# Patient Record
Sex: FEMALE | Race: WHITE | NOT HISPANIC OR LATINO | Employment: PART TIME | URBAN - METROPOLITAN AREA
[De-identification: names, ages, dates, MRNs, and addresses within clinical notes are randomized per-mention and may not be internally consistent; named-entity substitution may affect disease eponyms.]

---

## 2017-01-19 ENCOUNTER — ALLSCRIPTS OFFICE VISIT (OUTPATIENT)
Dept: OTHER | Facility: OTHER | Age: 27
End: 2017-01-19

## 2017-02-24 ENCOUNTER — ALLSCRIPTS OFFICE VISIT (OUTPATIENT)
Dept: OTHER | Facility: OTHER | Age: 27
End: 2017-02-24

## 2017-02-24 ENCOUNTER — HOSPITAL ENCOUNTER (OUTPATIENT)
Dept: RADIOLOGY | Facility: HOSPITAL | Age: 27
Discharge: HOME | End: 2017-02-24
Payer: COMMERCIAL

## 2017-02-24 ENCOUNTER — TRANSCRIBE ORDERS (OUTPATIENT)
Dept: ADMINISTRATIVE | Facility: HOSPITAL | Age: 27
End: 2017-02-24

## 2017-02-24 DIAGNOSIS — M53.3 SACROCOCCYGEAL DISORDERS, NOT ELSEWHERE CLASSIFIED: ICD-10-CM

## 2017-02-24 PROCEDURE — 72220 X-RAY EXAM SACRUM TAILBONE: CPT

## 2017-03-01 ENCOUNTER — GENERIC CONVERSION - ENCOUNTER (OUTPATIENT)
Dept: OTHER | Facility: OTHER | Age: 27
End: 2017-03-01

## 2017-07-10 ENCOUNTER — ALLSCRIPTS OFFICE VISIT (OUTPATIENT)
Dept: OTHER | Facility: OTHER | Age: 27
End: 2017-07-10

## 2017-07-10 DIAGNOSIS — R10.32 LEFT LOWER QUADRANT PAIN: ICD-10-CM

## 2017-07-11 ENCOUNTER — GENERIC CONVERSION - ENCOUNTER (OUTPATIENT)
Dept: OTHER | Facility: OTHER | Age: 27
End: 2017-07-11

## 2017-07-14 ENCOUNTER — GENERIC CONVERSION - ENCOUNTER (OUTPATIENT)
Dept: OTHER | Facility: OTHER | Age: 27
End: 2017-07-14

## 2017-07-14 LAB
ADEQUACY: (HISTORICAL): NORMAL
CHLAMYDIA DFA, NAA OR PCR (HISTORICAL): NEGATIVE
CLINICIAN PROVIDIED ICD 9 OR 10 (HISTORICAL): NORMAL
COMMENT (HISTORICAL): NORMAL
DIAGNOSIS (HISTORICAL): NORMAL
GC, DNA PROB (HISTORICAL): NEGATIVE
NOTE: (HISTORICAL): NORMAL
PERFORMED BY (HISTORICAL): NORMAL
REFLEX (HISTORICAL): NORMAL

## 2017-08-10 ENCOUNTER — ALLSCRIPTS OFFICE VISIT (OUTPATIENT)
Dept: OTHER | Facility: OTHER | Age: 27
End: 2017-08-10

## 2017-08-10 DIAGNOSIS — M41.20 OTHER IDIOPATHIC SCOLIOSIS, SITE UNSPECIFIED: ICD-10-CM

## 2017-08-15 ENCOUNTER — GENERIC CONVERSION - ENCOUNTER (OUTPATIENT)
Dept: OTHER | Facility: OTHER | Age: 27
End: 2017-08-15

## 2017-08-31 ENCOUNTER — ALLSCRIPTS OFFICE VISIT (OUTPATIENT)
Dept: OTHER | Facility: OTHER | Age: 27
End: 2017-08-31

## 2017-09-19 ENCOUNTER — GENERIC CONVERSION - ENCOUNTER (OUTPATIENT)
Dept: OTHER | Facility: OTHER | Age: 27
End: 2017-09-19

## 2017-09-28 ENCOUNTER — GENERIC CONVERSION - ENCOUNTER (OUTPATIENT)
Dept: OTHER | Facility: OTHER | Age: 27
End: 2017-09-28

## 2017-11-30 ENCOUNTER — TRANSCRIBE ORDERS (OUTPATIENT)
Dept: ADMINISTRATIVE | Facility: HOSPITAL | Age: 27
End: 2017-11-30

## 2017-11-30 DIAGNOSIS — Z36.89 SCREENING FOR PREGNANCY-ASSOCIATED PLASMA PROTEIN A: Primary | ICD-10-CM

## 2017-12-07 ENCOUNTER — GENERIC CONVERSION - ENCOUNTER (OUTPATIENT)
Dept: OTHER | Facility: OTHER | Age: 27
End: 2017-12-07

## 2017-12-07 ENCOUNTER — HOSPITAL ENCOUNTER (OUTPATIENT)
Dept: ULTRASOUND IMAGING | Facility: HOSPITAL | Age: 27
Discharge: HOME | End: 2017-12-07
Payer: COMMERCIAL

## 2017-12-07 DIAGNOSIS — Z36.89 SCREENING FOR PREGNANCY-ASSOCIATED PLASMA PROTEIN A: ICD-10-CM

## 2017-12-07 PROCEDURE — 76801 OB US < 14 WKS SINGLE FETUS: CPT

## 2017-12-13 ENCOUNTER — GENERIC CONVERSION - ENCOUNTER (OUTPATIENT)
Dept: FAMILY MEDICINE CLINIC | Facility: CLINIC | Age: 27
End: 2017-12-13

## 2018-01-11 NOTE — RESULT NOTES
Verified Results  * XR SACRUM AND COCCYX [id number] 09:53AM [facility] Number: [id number]     Test Name Result Flag Reference   XR SACRUM AND COCCYX (Report)     SACRUM AND COCCYX     INDICATION: Sacral/coccygeal pain  COMPARISON: None     VIEWS: 3     IMAGES: 3      FINDINGS:     There is no acute evidence of fracture  Lucency through the left hemisacrum appears to extend beyond the osseous margins suggesting its overlying bowel gas  Sacral arcuate lines are maintained  The SI joints appear symmetric  Pubic symphysis maintained  IMPRESSION:     No displaced fracture  Workstation performed: [id number]     Signed by:   Sajan Patino MD   2/27/17       Discussion/Summary   please call      there is no evidence of fracture of the sacrum/coccyx

## 2018-01-11 NOTE — MISCELLANEOUS
Message   Recorded as Task   Date: 10/19/2016 10:23 AM, Created By: Roselyn Orozco   Task Name: Medical Complaint Callback   Assigned To: [address] [place] [address]   Regarding Patient: Justice Fernández, Status: Active   Comment:    Danielle Xiao - 19 Oct 2016 10:23 AM     TASK CREATED  Caller: Self; Medical Complaint; [phone] (Home); [phone] (Work)  was in [address] [facility] er for chest pain - was advised to cardio and also to have an echo done -     does see a [facility], who is away, office staff advised to request echo order from pcp    (did see dr Solomon Washington 9/20 when dr Mata Malik was out of office, but not xferring to that office)   Galileo Rudolph - 19 Oct 2016 12:03 PM     TASK REASSIGNED: Previously Assigned To Galileo Rudolph Mary - 19 Oct 2016 4:27 PM     TASK REPLIED TO: Previously Assigned To [address] [place] [address]  Did you want to order the echo or sched w/her Cardiologist?   Galileo Rudolph - 19 Oct 2016 5:08 PM     TASK REASSIGNED: Previously Assigned To Galileo Rudolph      echo ordered and should get this  she should also schedule a visit with her cardio once he is available  Flakita Galvan - 19 Oct 2016 6:23 PM     TASK EDITED                 pt aware and has appt with card  11/4        Active Problems    1  Abrasion of nose (910 0) (S00 31XA)   2  Acute upper respiratory infection (465 9) (J06 9)   3  Adult congenital heart disease (746 9) (Q24 9)   4  Cellulitis of thigh (682 6) (L03 119)   5  Chest pain (786 50) (R07 9)   6  Chronic headache (784 0) (R51)   7  Exudative tonsillitis (463) (J03 90)   8  Flu vaccine need (V04 81) (Z23)   9  Mitral valve prolapse (424 0) (I34 1)   10  Oral contraceptive prescribed (V25 01) (Z30 011)   11  Pap smear for cervical cancer screening (V76 2) (Z12 4)   12  Pregnancy (V22 2) (Z33 1)   13  Screening for diabetes mellitus (V77 1) (Z13 1)   14   Screening for lipid disorders (V77 91) (Z13 220)   15  Upper respiratory infection (465 9) (J06 9)    Current Meds   1  Amoxicillin 500 MG Oral Capsule; TAKE 1 CAPSULE 3 TIMES DAILY UNTIL GONE;   Therapy: 57WHO6077 to (Evaluate:32Ghw9836)  Requested for: 38INW8500; Last   Rx:74Xjr4375 Ordered   2  Cephalexin 500 MG Oral Tablet; take 1 tab by mouh 4 times a day; Therapy: 20Jun2016 to (Last Rx:20Jun2016)  Requested for: 20Jun2016 Ordered   3  Prenatal 28-0 8 MG Oral Tablet; Therapy: 83LRH0182 to Recorded    Allergies    1   Benadryl TABS    Signatures   Electronically signed by : Shreyas Toussaint MD; Oct 20 2016 11:10AM EST                       (Author)

## 2018-01-11 NOTE — MISCELLANEOUS
Message   Recorded as Task   Date: 12/06/2016 10:44 AM, Created By: Beatriz Jackson   Task Name: Medical Complaint Callback   Assigned To: [address] [facility]   Regarding Patient: Carlee Perry, Status: Active   Comment:    Beatriz Jackson - 06 Dec 2016 10:44 AM     TASK CREATED  Caller: Self; Medical Complaint; [phone] (Home); [phone] (Work)  was in yesterday to have b ears flushed - pt stating that clogged feeling is worse and she can barely hear - no drainage - no pain - no fever - is this normal?  should she be seen again? please advise   William Salazarman - 06 Dec 2016 10:54 AM     TASK REPLIED TO: Previously Assigned To Hyun Lynn                      she needs to see ENT   Beatriz Jackson - 06 Dec 2016 10:58 AM     TASK REASSIGNED: Previously Assigned To Beatriz Jackson Pamela - 06 Dec 2016 11:51 AM     TASK REPLIED TO: Previously Assigned To [address] [facility]  Pt aware        Active Problems    1  History of Abrasion of nose (910 0) (S00 31XA)   2  Adult congenital heart disease (746 9) (Q24 9)   3  Chronic headache (784 0) (R51)   4  Flu vaccine need (V04 81) (Z23)   5  History of chest pain (V13 89) (Z87 898)   6  History of pregnancy (V13 29)   7  History of upper respiratory infection (V12 09) (Z87 09)   8  Impacted cerumen of both ears (380 4) (H61 23)   9  Mitral valve prolapse (424 0) (I34 1)   10  Pap smear for cervical cancer screening (V76 2) (Z12 4)   11  Screening for diabetes mellitus (V77 1) (Z13 1)   12  Screening for lipid disorders (V77 91) (Z13 220)    Current Meds   1  No Reported Medications Recorded    Allergies    1   Benadryl TABS    Signatures   Electronically signed by : Barbara Campa DO; Dec  6 2016 11:55AM EST                       (Author)

## 2018-01-12 VITALS
SYSTOLIC BLOOD PRESSURE: 110 MMHG | HEIGHT: 62 IN | BODY MASS INDEX: 23.97 KG/M2 | DIASTOLIC BLOOD PRESSURE: 64 MMHG | WEIGHT: 130.25 LBS

## 2018-01-12 NOTE — MISCELLANEOUS
Message   Recorded as Task   Date: 10/28/2016 01:36 PM, Created By: Brock Joseph   Task Name: Intake   Assigned To: [address] [place] [address]   Regarding Patient: Lucretia Santizo, Status: Active   Comment:    Grace Camargo - 28 Oct 2016 1:36 PM     TASK CREATED  Caller: Self; Other; [phone] (Home); [phone] (Work)  FYI:  Pt did not go to the appt for her Echo on 10/19  She has an appt w/ her Cardiologist Flakita Mcmillan - 28 Oct 2016 1:47 PM     TASK REASSIGNED: Previously Assigned To [address] [place] [address]   Galileo Rudolph - 29 Oct 2016 7:44 AM     TASK REASSIGNED: Previously Assigned To Galileo Rudolph, noted        Active Problems    1  Abrasion of nose (910 0) (S00 31XA)   2  Acute upper respiratory infection (465 9) (J06 9)   3  Adult congenital heart disease (746 9) (Q24 9)   4  Cellulitis of thigh (682 6) (L03 119)   5  Chest pain (786 50) (R07 9)   6  Chronic headache (784 0) (R51)   7  Exudative tonsillitis (463) (J03 90)   8  Flu vaccine need (V04 81) (Z23)   9  Mitral valve prolapse (424 0) (I34 1)   10  Oral contraceptive prescribed (V25 01) (Z30 011)   11  Pap smear for cervical cancer screening (V76 2) (Z12 4)   12  Pregnancy (V22 2) (Z33 1)   13  Screening for diabetes mellitus (V77 1) (Z13 1)   14  Screening for lipid disorders (V77 91) (Z13 220)   15  Upper respiratory infection (465 9) (J06 9)    Current Meds   1  Amoxicillin 500 MG Oral Capsule; TAKE 1 CAPSULE 3 TIMES DAILY UNTIL GONE;   Therapy: 58VLV8279 to (Evaluate:07Jdr6516)  Requested for: 50DUO0883; Last   Rx:83Smz2467 Ordered   2  Cephalexin 500 MG Oral Tablet (Cephalexin Monohydrate); take 1 tab by mouh 4 times   a day; Therapy: 37Twc1654 to (Last Rx:07Kwj1434)  Requested for: 91Slr1918 Ordered   3  Prenatal 28-0 8 MG Oral Tablet; Therapy: 60TDP8173 to Recorded    Allergies    1   Benadryl TABS    Signatures   Electronically signed by : Anson Rush, ; Oct 29 2016 11:10AM EST                       (Author)

## 2018-01-12 NOTE — MISCELLANEOUS
Message   Recorded as Task   Date: 07/14/2017 12:47 PM, Created By: Dori Zamora   Task Name: Go to Result   Assigned To: Noemí Sam   Regarding Patient: Lucretia Santizo, Status: Active   Comment:    Dori Zamora - 14 Jul 2017 12:47 PM     TASK CREATED  nml pap, neg chlamydia and gonorrhea   Patient informed, left message  Active Problems    1  History of Abrasion of nose (910 0) (S00 31XA)   2  Adult congenital heart disease (746 9) (Q24 9)   3  Chronic headache (784 0) (R51)   4  Cough (786 2) (R05)   5  Encounter for annual routine gynecological examination (V72 31) (Z01 419)   6  Fall on stairs (E880 9) (W10 9XXA)   7  Flu vaccine need (V04 81) (Z23)   8  History of chest pain (V13 89) (Z87 898)   9  History of pregnancy (V13 29)   10  Impacted cerumen of both ears (380 4) (H61 23)   11  Left lower quadrant pain (789 04) (R10 32)   12  Mitral valve prolapse (424 0) (I34 1)   13  Pain in the coccyx (724 79) (M53 3)   14  Pap smear for cervical cancer screening (V76 2) (Z12 4)   15  Postcoital bleeding (626 7) (N93 0)   16  Screening for diabetes mellitus (V77 1) (Z13 1)   17  Screening for lipid disorders (V77 91) (Z13 220)   18  Screening for STDs (sexually transmitted diseases) (V74 5) (Z11 3)   19  Wheezing (786 07) (R06 2)    Current Meds   1  Ibuprofen 600 MG Oral Tablet; TAKE 1 TABLET BY MOUTH EVERY 6 HOURS IF   NEEDED;    Therapy: 54IEC2631 to (Evaluate:16Apr2017)  Requested for: 13EWA6947; Last   Rx:01Mar2017 Ordered    Signatures   Electronically signed by : Sherin Dumont, ; Jul 14 2017  2:28PM EST                       (Author)

## 2018-01-13 VITALS
BODY MASS INDEX: 24.68 KG/M2 | HEIGHT: 62 IN | WEIGHT: 134.13 LBS | SYSTOLIC BLOOD PRESSURE: 118 MMHG | DIASTOLIC BLOOD PRESSURE: 70 MMHG

## 2018-01-14 VITALS
HEART RATE: 76 BPM | HEIGHT: 62 IN | RESPIRATION RATE: 16 BRPM | WEIGHT: 132.25 LBS | DIASTOLIC BLOOD PRESSURE: 60 MMHG | SYSTOLIC BLOOD PRESSURE: 110 MMHG | BODY MASS INDEX: 24.34 KG/M2 | OXYGEN SATURATION: 97 %

## 2018-01-14 VITALS
BODY MASS INDEX: 24 KG/M2 | HEART RATE: 84 BPM | WEIGHT: 130.4 LBS | SYSTOLIC BLOOD PRESSURE: 118 MMHG | DIASTOLIC BLOOD PRESSURE: 74 MMHG | HEIGHT: 62 IN | TEMPERATURE: 98 F

## 2018-01-14 VITALS
BODY MASS INDEX: 23.74 KG/M2 | TEMPERATURE: 98.2 F | OXYGEN SATURATION: 98 % | HEIGHT: 62 IN | WEIGHT: 129 LBS | HEART RATE: 78 BPM | SYSTOLIC BLOOD PRESSURE: 120 MMHG | DIASTOLIC BLOOD PRESSURE: 72 MMHG

## 2018-01-15 NOTE — MISCELLANEOUS
Message   Date: 15 Aug 2017 3:12 PM EST, Recorded By: Kathie Lucio   Calling For: Alisonha Peaks: Geeta Archer, Self   Phone: [phone] (Home), [phone] (Work)   Reason: Other   Patient called to schedule Nexplanon removal   [facility] 2 1/2 years ago [facility] [address]  Now wants to conceive again  Advised schedule  Bring records of insertion  Active Problems    1  History of Abrasion of nose (910 0) (S00 31XA)   2  Adult congenital heart disease (746 9) (Q24 9)   3  Chronic headache (784 0) (R51)   4  Cough (786 2) (R05)   5  Encounter for annual routine gynecological examination (V72 31) (Z01 419)   6  Fall on stairs (E880 9) (W10 9XXA)   7  Flu vaccine need (V04 81) (Z23)   8  History of chest pain (V13 89) (Z87 898)   9  History of pregnancy (V13 29)   10  Impacted cerumen of both ears (380 4) (H61 23)   11  Left lower quadrant pain (789 04) (R10 32)   12  Mitral valve prolapse (424 0) (I34 1)   13  Pain in the coccyx (724 79) (M53 3)   14  Pap smear for cervical cancer screening (V76 2) (Z12 4)   15  Postcoital bleeding (626 7) (N93 0)   16  Scoliosis (and kyphoscoliosis), idiopathic (737 30) (M41 20)   17  Screening for diabetes mellitus (V77 1) (Z13 1)   18  Screening for lipid disorders (V77 91) (Z13 220)   19  Screening for STDs (sexually transmitted diseases) (V74 5) (Z11 3)   20  Ventral hernia without obstruction or gangrene (553 20) (K43 9)   21  Wheezing (786 07) (R06 2)    Current Meds   1  Ibuprofen 600 MG Oral Tablet; TAKE 1 TABLET BY MOUTH EVERY 6 HOURS IF   NEEDED; Therapy: 31OWI7073 to (Evaluate:16Apr2017)  Requested for: 02DBV2180; Last   Rx:01Mar2017 Ordered    Allergies    1   No Known Drug Allergies    Signatures   Electronically signed by : Yuniel Ho, ; Aug 15 2017  3:14PM EST                       (Author)

## 2018-01-22 VITALS
SYSTOLIC BLOOD PRESSURE: 110 MMHG | BODY MASS INDEX: 24.68 KG/M2 | HEIGHT: 62 IN | HEART RATE: 82 BPM | RESPIRATION RATE: 16 BRPM | OXYGEN SATURATION: 98 % | DIASTOLIC BLOOD PRESSURE: 60 MMHG | WEIGHT: 134.13 LBS | TEMPERATURE: 98.1 F

## 2018-01-24 ENCOUNTER — TELEPHONE (OUTPATIENT)
Dept: FAMILY MEDICINE CLINIC | Facility: CLINIC | Age: 28
End: 2018-01-24

## 2018-02-19 ENCOUNTER — TELEPHONE (OUTPATIENT)
Dept: FAMILY MEDICINE CLINIC | Facility: CLINIC | Age: 28
End: 2018-02-19

## 2018-05-09 ENCOUNTER — TRANSCRIBE ORDERS (OUTPATIENT)
Dept: ADMINISTRATIVE | Facility: HOSPITAL | Age: 28
End: 2018-05-09

## 2018-05-09 ENCOUNTER — TELEPHONE (OUTPATIENT)
Dept: FAMILY MEDICINE CLINIC | Facility: CLINIC | Age: 28
End: 2018-05-09

## 2018-05-09 DIAGNOSIS — R01.1 MURMUR: Primary | ICD-10-CM

## 2018-05-09 NOTE — TELEPHONE ENCOUNTER
[facility]--Referral #: [id number] Effective: 05/09/2018 Expires: 08/06/2018       [facility]--Referral #: [id number] Effective: 05/09/2018 Expires: 08/06/2018

## 2018-05-09 NOTE — TELEPHONE ENCOUNTER
[facility]-Maternal fetal Medicine  Appt 5/18/18  [facility]--[id number]  [facility]--[id number]  DX-Q24 9--use for both Ref

## 2018-05-16 ENCOUNTER — HOSPITAL ENCOUNTER (OUTPATIENT)
Dept: NON INVASIVE DIAGNOSTICS | Facility: HOSPITAL | Age: 28
Discharge: HOME | End: 2018-05-16
Payer: COMMERCIAL

## 2018-05-16 DIAGNOSIS — R01.1 MURMUR: ICD-10-CM

## 2018-05-16 PROCEDURE — 93306 TTE W/DOPPLER COMPLETE: CPT | Performed by: INTERNAL MEDICINE

## 2018-05-16 PROCEDURE — 93306 TTE W/DOPPLER COMPLETE: CPT

## 2018-06-01 ENCOUNTER — TELEPHONE (OUTPATIENT)
Dept: FAMILY MEDICINE CLINIC | Facility: CLINIC | Age: 28
End: 2018-06-01

## 2018-06-01 NOTE — TELEPHONE ENCOUNTER
Referral #: [id number]  Effective: 06/01/2018  Expires: 08/29/2018    Referral [id number] has been successfully submitted     Eather Curling [address] [place] [address] [id number]   PATIENT'S INSURANCE  Member ID: [id number]  PRIMARY CARE PHYSICIAN  [facility]   [place]PI: [id number]   From  [address]   NPI: [id number]  [address]   To  [address] [place] [address]  NPI: [id number]  [address] [place], [address]   ,

## 2018-06-08 ENCOUNTER — OFFICE VISIT (OUTPATIENT)
Dept: FAMILY MEDICINE CLINIC | Facility: CLINIC | Age: 28
End: 2018-06-08
Payer: COMMERCIAL

## 2018-06-08 VITALS
HEIGHT: 62 IN | DIASTOLIC BLOOD PRESSURE: 84 MMHG | HEART RATE: 90 BPM | SYSTOLIC BLOOD PRESSURE: 122 MMHG | BODY MASS INDEX: 28.34 KG/M2 | WEIGHT: 154 LBS | OXYGEN SATURATION: 98 %

## 2018-06-08 DIAGNOSIS — H61.23 HEARING LOSS DUE TO CERUMEN IMPACTION, BILATERAL: Primary | ICD-10-CM

## 2018-06-08 DIAGNOSIS — J30.9 ALLERGIC RHINITIS, UNSPECIFIED SEASONALITY, UNSPECIFIED TRIGGER: ICD-10-CM

## 2018-06-08 PROCEDURE — 99213 OFFICE O/P EST LOW 20 MIN: CPT | Performed by: NURSE PRACTITIONER

## 2018-06-08 PROCEDURE — 3008F BODY MASS INDEX DOCD: CPT | Performed by: NURSE PRACTITIONER

## 2018-06-08 NOTE — PATIENT INSTRUCTIONS
Cerumen impaction removed from b/l ears  Continue Claritin as directed  Call or return for problems/concerns

## 2018-06-08 NOTE — PROGRESS NOTES
[address] [facility]        NAME: Alicia Centeno is a 32 y o  female  : 1990    MRN: [id number]  DATE: 2018  TIME: 9:55 AM    Assessment and Plan   Hearing loss due to cerumen impaction, bilateral [H61 23]  1  Hearing loss due to cerumen impaction, bilateral     2  Allergic rhinitis, unspecified seasonality, unspecified trigger           Patient Instructions     Patient Instructions   Cerumen impaction removed from b/l ears  Continue Claritin as directed  Call or return for problems/concerns          Chief Complaint     Chief Complaint   Patient presents with    Earache         History of Present Illness       35 weeks pregnant  c/o right ear fullness/hearing loss x 2 weeks  c/o sinus congestion x 3 weeks  Mild cough  No fevers  No nausea/vomiting/mild diarrhea  Is taking Claritin daily        Review of Systems   Review of Systems   Constitutional: Negative for activity change, chills, fatigue and fever  HENT: Positive for congestion, hearing loss, postnasal drip and rhinorrhea  Negative for ear pain, sinus pressure and sore throat  Eyes: Negative for pain, discharge and redness  Respiratory: Positive for cough  Negative for wheezing  Cardiovascular: Negative for chest pain  Gastrointestinal: Positive for diarrhea  Negative for constipation, nausea and vomiting  Musculoskeletal: Negative for myalgias  Skin: Negative for rash  Neurological: Negative for dizziness and headaches           Current Medications       Current Outpatient Prescriptions:     IRON PO, Take 1 tablet by mouth, Disp: , Rfl:     Current Allergies     Allergies as of 2018 - Reviewed 2018   Allergen Reaction Noted    Diphenhydramine Rash, Shortness Of Breath, and Swelling 2015            The following portions of the patient's history were reviewed and updated as appropriate: allergies, current medications, past family history, past medical history, past social history, past surgical history and problem list      Past Medical History:   Diagnosis Date    Congenital heart disease     Mitral valve regurgitation        Past Surgical History:   Procedure Laterality Date    CARDIAC SURGERY         Family History   Problem Relation Age of Onset    Throat cancer Mother     Hypertension Father     Hyperlipidemia Father     Heart murmur Father     Hypertension Maternal Grandfather     Diabetes Maternal Grandfather          Medications have been verified  Objective   /84   Pulse 90   Ht 5' 2" (1 575 m)   Wt 69 9 kg (154 lb) Comment: 35 weeks pregnant  SpO2 98%   BMI 28 17 kg/m²        Physical Exam     Physical Exam   Constitutional: She is oriented to person, place, and time  She appears well-developed and well-nourished  No distress  HENT:   Head: Normocephalic and atraumatic  Right Ear: Tympanic membrane and external ear normal  Decreased hearing (cerumen impaction: flushed with warm water- successful removal of cerumen: repeat assessment: WNL, hearing improved per patient) is noted  Left Ear: Tympanic membrane and external ear normal  Decreased hearing (cerumen impaction: flushed with warm water- successful removal of cerumen: repeat assessment: WNL, hearing improved per patient) is noted  Nose: Rhinorrhea present  Mouth/Throat: Uvula is midline, oropharynx is clear and moist and mucous membranes are normal  No oropharyngeal exudate  Eyes: Conjunctivae and EOM are normal  Right eye exhibits no discharge  Left eye exhibits no discharge  Neck: Normal range of motion  Neck supple  No thyromegaly present  Cardiovascular: Normal rate, regular rhythm and normal heart sounds  Exam reveals no gallop and no friction rub  No murmur heard  Pulmonary/Chest: Effort normal and breath sounds normal  No respiratory distress  She has no wheezes  She has no rales  Musculoskeletal: Normal range of motion     Lymphadenopathy:     She has no cervical adenopathy  Neurological: She is alert and oriented to person, place, and time  No cranial nerve deficit  Coordination normal    Skin: Skin is warm and dry  She is not diaphoretic  Psychiatric: She has a normal mood and affect  Her behavior is normal  Judgment and thought content normal    Nursing note and vitals reviewed

## 2018-08-12 ENCOUNTER — OFFICE VISIT (OUTPATIENT)
Dept: URGENT CARE | Facility: CLINIC | Age: 28
End: 2018-08-12
Payer: COMMERCIAL

## 2018-08-12 VITALS
TEMPERATURE: 97.5 F | OXYGEN SATURATION: 100 % | WEIGHT: 141 LBS | HEIGHT: 62 IN | HEART RATE: 57 BPM | DIASTOLIC BLOOD PRESSURE: 72 MMHG | BODY MASS INDEX: 25.95 KG/M2 | SYSTOLIC BLOOD PRESSURE: 110 MMHG | RESPIRATION RATE: 16 BRPM

## 2018-08-12 DIAGNOSIS — S39.012A STRAIN OF LUMBAR REGION, INITIAL ENCOUNTER: Primary | ICD-10-CM

## 2018-08-12 DIAGNOSIS — R10.9 FLANK PAIN: ICD-10-CM

## 2018-08-12 LAB
SL AMB  POCT GLUCOSE, UA: NORMAL
SL AMB LEUKOCYTE ESTERASE,UA: NORMAL
SL AMB POCT BILIRUBIN,UA: NORMAL
SL AMB POCT BLOOD,UA: NORMAL
SL AMB POCT CLARITY,UA: CLEAR
SL AMB POCT COLOR,UA: CLEAR
SL AMB POCT KETONES,UA: NORMAL
SL AMB POCT NITRITE,UA: NORMAL
SL AMB POCT PH,UA: 5
SL AMB POCT SPECIFIC GRAVITY,UA: 1
SL AMB POCT URINE PROTEIN: NORMAL
SL AMB POCT UROBILINOGEN: 0.2

## 2018-08-12 PROCEDURE — 87086 URINE CULTURE/COLONY COUNT: CPT | Performed by: PREVENTIVE MEDICINE

## 2018-08-12 PROCEDURE — 99203 OFFICE O/P NEW LOW 30 MIN: CPT | Performed by: PREVENTIVE MEDICINE

## 2018-08-12 NOTE — PROGRESS NOTES
[facility]        NAME: Ian Lyons is a 32 y o  female  : 1990    MRN: [id number]  DATE: 2018  TIME: 5:45 PM    Assessment and Plan   Strain of lumbar region, initial encounter [S39 012A]  1  Strain of lumbar region, initial encounter     2  Flank pain  POCT urine dip    Urine culture         Patient Instructions       Follow up with PCP in 3-5 days  Proceed to  ER if symptoms worsen  Chief Complaint     Chief Complaint   Patient presents with    Back Pain     Started 3 days ago with off and on lower right side, right thigh and right lower abd  Today has been constant  Shooting/achy/crampy feeling  Recent  6 weeks ago  LIfts using her right side  History of Present Illness       Low back pain on the right side x3 or 4 days  The pain will radiate to the anterior right thigh  No numbness or tingling in the right lower extremity no weakness of the right lower extremity  Also, some cramping in the deep right lower quadrant right where the  scar is  Review of Systems   Review of Systems   Gastrointestinal: Positive for abdominal pain  Musculoskeletal: Positive for back pain           Current Medications       Current Outpatient Prescriptions:     IRON PO, Take 1 tablet by mouth, Disp: , Rfl:     Current Allergies     Allergies as of 2018 - Reviewed 2018   Allergen Reaction Noted    Diphenhydramine Rash, Shortness Of Breath, and Swelling 2015            The following portions of the patient's history were reviewed and updated as appropriate: allergies, current medications, past family history, past medical history, past social history, past surgical history and problem list      Past Medical History:   Diagnosis Date    Congenital heart disease     Mitral valve regurgitation        Past Surgical History:   Procedure Laterality Date    CARDIAC SURGERY         Family History   Problem Relation Age of Onset    Throat cancer Mother     Hypertension Father     Hyperlipidemia Father     Heart murmur Father     Hypertension Maternal Grandfather     Diabetes Maternal Grandfather          Medications have been verified  Objective   /72   Pulse 57   Temp 97 5 °F (36 4 °C)   Resp 16   Ht 5' 2" (1 575 m)   Wt 64 kg (141 lb)   SpO2 100%   BMI 25 79 kg/m²        Physical Exam     Physical Exam   Abdominal:   The abdomen is soft in all 4 quadrants with no organomegaly or masses  There is no guarding or rebound  Very deep pressure on palpation in the right lower quadrant around the  scar does produce mild discomfort  Musculoskeletal:   Seated lying straight leg raise of both negative at 90°  She is able hyperextended back lateral bend both right and left and forward flex without pain or discomfort in the back  Palpating the right paralumbar area around L3-L4 L5 does produce some discomfort and pain

## 2018-08-12 NOTE — PATIENT INSTRUCTIONS
For the low back pain I like to use ice 3 or 4 times a day and the ibuprofen the have at home 600 mg 3 times a day  If this is not improving in 5-7 days talk your family doctor about physical therapy  The abdominal pain might be due to adhesions or still pain from the scar area  If the abdominal pain becomes worse you must immediately go to emergency room to be checked

## 2018-08-13 LAB — BACTERIA UR CULT: NORMAL

## 2018-08-16 DIAGNOSIS — R51.9 CHRONIC NONINTRACTABLE HEADACHE, UNSPECIFIED HEADACHE TYPE: Primary | ICD-10-CM

## 2018-08-16 DIAGNOSIS — G89.29 CHRONIC NONINTRACTABLE HEADACHE, UNSPECIFIED HEADACHE TYPE: Primary | ICD-10-CM

## 2018-08-16 RX ORDER — IBUPROFEN 600 MG/1
TABLET ORAL
Refills: 0 | COMMUNITY
Start: 2018-07-08 | End: 2018-08-16 | Stop reason: SDUPTHER

## 2018-08-16 RX ORDER — IBUPROFEN 600 MG/1
600 TABLET ORAL EVERY 6 HOURS PRN
Qty: 90 TABLET | Refills: 1 | Status: SHIPPED | OUTPATIENT
Start: 2018-08-16 | End: 2018-11-26 | Stop reason: SDUPTHER

## 2018-08-21 ENCOUNTER — TRANSCRIBE ORDERS (OUTPATIENT)
Dept: ADMINISTRATIVE | Facility: HOSPITAL | Age: 28
End: 2018-08-21

## 2018-08-21 DIAGNOSIS — R01.1 HEART MURMUR: Primary | ICD-10-CM

## 2018-08-24 ENCOUNTER — HOSPITAL ENCOUNTER (OUTPATIENT)
Dept: NON INVASIVE DIAGNOSTICS | Facility: CLINIC | Age: 28
Discharge: HOME | End: 2018-08-24
Payer: COMMERCIAL

## 2018-08-24 DIAGNOSIS — R01.1 HEART MURMUR: ICD-10-CM

## 2018-08-24 PROCEDURE — 93226 XTRNL ECG REC<48 HR SCAN A/R: CPT

## 2018-08-24 PROCEDURE — 93225 XTRNL ECG REC<48 HRS REC: CPT

## 2018-09-12 PROCEDURE — 93227 XTRNL ECG REC<48 HR R&I: CPT | Performed by: INTERNAL MEDICINE

## 2018-11-26 DIAGNOSIS — G89.29 CHRONIC NONINTRACTABLE HEADACHE, UNSPECIFIED HEADACHE TYPE: ICD-10-CM

## 2018-11-26 DIAGNOSIS — R51.9 CHRONIC NONINTRACTABLE HEADACHE, UNSPECIFIED HEADACHE TYPE: ICD-10-CM

## 2018-11-26 NOTE — TELEPHONE ENCOUNTER
Please review-not sure why she is still taking this  Last prescription was 3 months ago  If she still having a problem she probably should be seen

## 2018-11-27 RX ORDER — IBUPROFEN 600 MG/1
600 TABLET ORAL EVERY 6 HOURS PRN
Qty: 90 TABLET | Refills: 1 | Status: SHIPPED | OUTPATIENT
Start: 2018-11-27 | End: 2019-04-29 | Stop reason: SDUPTHER

## 2019-03-19 ENCOUNTER — OFFICE VISIT (OUTPATIENT)
Dept: FAMILY MEDICINE CLINIC | Facility: CLINIC | Age: 29
End: 2019-03-19
Payer: COMMERCIAL

## 2019-03-19 VITALS
BODY MASS INDEX: 23.74 KG/M2 | HEIGHT: 62 IN | HEART RATE: 88 BPM | SYSTOLIC BLOOD PRESSURE: 118 MMHG | WEIGHT: 129 LBS | OXYGEN SATURATION: 97 % | DIASTOLIC BLOOD PRESSURE: 74 MMHG

## 2019-03-19 DIAGNOSIS — J02.9 PHARYNGITIS, UNSPECIFIED ETIOLOGY: ICD-10-CM

## 2019-03-19 DIAGNOSIS — K43.9 VENTRAL HERNIA WITHOUT OBSTRUCTION OR GANGRENE: Primary | ICD-10-CM

## 2019-03-19 PROCEDURE — 99213 OFFICE O/P EST LOW 20 MIN: CPT | Performed by: NURSE PRACTITIONER

## 2019-03-19 NOTE — PATIENT INSTRUCTIONS
Discussed allergic vs viral vs bacterial infection  OTC allergy/cough/cold medications as directed  F/up with general surgery for umbilical hernia-referral given  Call/return if symptoms worsen  Call or return for problems/concerns

## 2019-03-19 NOTE — PROGRESS NOTES
[facility]        NAME: Eder Fair is a 29 y o  female  : 1990    MRN: [id number]  DATE: 2019  TIME: 1:44 PM    Assessment and Plan   Ventral hernia without obstruction or gangrene [K43 9]  1  Ventral hernia without obstruction or gangrene  Ambulatory referral to General Surgery   2  Pharyngitis, unspecified etiology           Patient Instructions     Patient Instructions   Discussed allergic vs viral vs bacterial infection  OTC allergy/cough/cold medications as directed  F/up with general surgery for umbilical hernia-referral given  Call/return if symptoms worsen  Call or return for problems/concerns            Chief Complaint     Chief Complaint   Patient presents with    Mass     on neck right side     Hernia     imbilical-- larger after children         History of Present Illness       C/o swollen gland on right side of neck, throat is sore on right side, hurts to swallow, post nasal drip x 3 days  C/o change in taste  Developed an umbilical hernia 3 years ago after childbirth  Is getting larger, more tender, and more difficult to push it back in  Would like to see general surgery  Review of Systems   Review of Systems   Constitutional: Positive for appetite change (change in taste)  Negative for chills, diaphoresis, fatigue and fever  HENT: Positive for congestion (ears clogged, sinus congestion), postnasal drip and sore throat (sore on right side)  Negative for trouble swallowing  Eyes: Negative  Respiratory: Positive for cough (dry cough)  Negative for wheezing  Gastrointestinal: Positive for abdominal distention (at hernia site) and abdominal pain (tenderness to the right of belly button)  Negative for nausea and vomiting  Genitourinary: Negative  Neurological: Negative            Current Medications       Current Outpatient Medications:     ibuprofen (MOTRIN) 600 mg tablet, TAKE 1 TABLET (600 MG TOTAL) BY MOUTH EVERY 6 (SIX) HOURS AS NEEDED FOR MILD PAIN, Disp: 90 tablet, Rfl: 1    Current Allergies     Allergies as of 03/19/2019 - Reviewed 03/19/2019   Allergen Reaction Noted    Diphenhydramine Rash, Shortness Of Breath, and Swelling 04/08/2015            The following portions of the patient's history were reviewed and updated as appropriate: allergies, current medications, past family history, past medical history, past social history, past surgical history and problem list      Past Medical History:   Diagnosis Date    Chest pain     resolved 12/5/16    Congenital heart disease     Exudative tonsillitis     last assessed 9/20/16  documented resolved 12/5/16    Mitral valve prolapse     last assessed 3/26/15    Mitral valve regurgitation        Past Surgical History:   Procedure Laterality Date    ATRIOVENTRICULAR CANAL REPAIR      resolved    CARDIAC SURGERY         Family History   Problem Relation Age of Onset    Throat cancer Mother     Alcohol abuse Mother     Hypertension Father     Hyperlipidemia Father     Heart murmur Father     Hypertension Maternal Grandfather     Diabetes Maternal Grandfather     Mental illness Family     Substance Abuse Neg Hx         family history         Medications have been verified  Objective   /74   Pulse 88   Ht 5' 2" (1 575 m)   Wt 58 5 kg (129 lb)   SpO2 97%   BMI 23 59 kg/m²        Physical Exam     Physical Exam   Constitutional: She is oriented to person, place, and time  She appears well-developed and well-nourished  No distress  HENT:   Head: Normocephalic and atraumatic  Right Ear: Tympanic membrane, external ear and ear canal normal    Left Ear: Tympanic membrane, external ear and ear canal normal    Nose: No rhinorrhea  Right sinus exhibits no maxillary sinus tenderness and no frontal sinus tenderness  Left sinus exhibits no maxillary sinus tenderness and no frontal sinus tenderness     Mouth/Throat: Uvula is midline and mucous membranes are normal  Posterior oropharyngeal erythema (mucous noted on posterior throat) present  No oropharyngeal exudate or tonsillar abscesses  Tonsils are 2+ on the right  Tonsils are 1+ on the left  Cardiovascular: Normal rate, regular rhythm and normal heart sounds  Exam reveals no gallop and no friction rub  No murmur heard  Pulmonary/Chest: Effort normal and breath sounds normal  No respiratory distress  She has no wheezes  She has no rales  She exhibits no tenderness  Abdominal: Soft  Bowel sounds are normal  There is tenderness in the periumbilical area  A hernia is present  Hernia confirmed positive in the ventral area  Lymphadenopathy:        Head (right side): Tonsillar adenopathy present  Head (left side): Tonsillar adenopathy present  Neurological: She is alert and oriented to person, place, and time  Psychiatric: She has a normal mood and affect  Her behavior is normal  Judgment and thought content normal    Nursing note and vitals reviewed

## 2019-04-12 ENCOUNTER — OFFICE VISIT (OUTPATIENT)
Dept: FAMILY MEDICINE CLINIC | Facility: CLINIC | Age: 29
End: 2019-04-12
Payer: COMMERCIAL

## 2019-04-12 VITALS
DIASTOLIC BLOOD PRESSURE: 68 MMHG | OXYGEN SATURATION: 99 % | TEMPERATURE: 102.6 F | HEIGHT: 62 IN | WEIGHT: 125 LBS | HEART RATE: 98 BPM | BODY MASS INDEX: 23 KG/M2 | SYSTOLIC BLOOD PRESSURE: 118 MMHG

## 2019-04-12 DIAGNOSIS — J02.0 STREP THROAT: Primary | ICD-10-CM

## 2019-04-12 PROCEDURE — 1036F TOBACCO NON-USER: CPT | Performed by: NURSE PRACTITIONER

## 2019-04-12 PROCEDURE — 99213 OFFICE O/P EST LOW 20 MIN: CPT | Performed by: NURSE PRACTITIONER

## 2019-04-12 PROCEDURE — 3008F BODY MASS INDEX DOCD: CPT | Performed by: NURSE PRACTITIONER

## 2019-04-12 RX ORDER — AMOXICILLIN 500 MG/1
500 CAPSULE ORAL EVERY 8 HOURS SCHEDULED
Qty: 30 CAPSULE | Refills: 0 | Status: SHIPPED | OUTPATIENT
Start: 2019-04-12 | End: 2019-04-22

## 2019-04-29 ENCOUNTER — OFFICE VISIT (OUTPATIENT)
Dept: FAMILY MEDICINE CLINIC | Facility: CLINIC | Age: 29
End: 2019-04-29
Payer: COMMERCIAL

## 2019-04-29 VITALS
WEIGHT: 127 LBS | BODY MASS INDEX: 23.37 KG/M2 | TEMPERATURE: 98.1 F | OXYGEN SATURATION: 99 % | SYSTOLIC BLOOD PRESSURE: 118 MMHG | HEART RATE: 88 BPM | DIASTOLIC BLOOD PRESSURE: 74 MMHG | HEIGHT: 62 IN

## 2019-04-29 DIAGNOSIS — J02.0 STREP THROAT: Primary | ICD-10-CM

## 2019-04-29 DIAGNOSIS — R51.9 CHRONIC NONINTRACTABLE HEADACHE, UNSPECIFIED HEADACHE TYPE: ICD-10-CM

## 2019-04-29 DIAGNOSIS — G89.29 CHRONIC NONINTRACTABLE HEADACHE, UNSPECIFIED HEADACHE TYPE: ICD-10-CM

## 2019-04-29 PROCEDURE — 99213 OFFICE O/P EST LOW 20 MIN: CPT | Performed by: FAMILY MEDICINE

## 2019-04-29 PROCEDURE — 1036F TOBACCO NON-USER: CPT | Performed by: FAMILY MEDICINE

## 2019-04-29 PROCEDURE — 3008F BODY MASS INDEX DOCD: CPT | Performed by: FAMILY MEDICINE

## 2019-04-29 RX ORDER — IBUPROFEN 600 MG/1
600 TABLET ORAL EVERY 6 HOURS PRN
Qty: 90 TABLET | Refills: 1 | Status: SHIPPED | OUTPATIENT
Start: 2019-04-29 | End: 2019-10-16 | Stop reason: SDUPTHER

## 2019-04-29 RX ORDER — AZITHROMYCIN 250 MG/1
TABLET, FILM COATED ORAL
Qty: 6 TABLET | Refills: 0 | Status: SHIPPED | OUTPATIENT
Start: 2019-04-29 | End: 2019-05-03

## 2019-07-19 ENCOUNTER — OFFICE VISIT (OUTPATIENT)
Dept: FAMILY MEDICINE CLINIC | Facility: CLINIC | Age: 29
End: 2019-07-19
Payer: COMMERCIAL

## 2019-07-19 VITALS
OXYGEN SATURATION: 99 % | HEART RATE: 71 BPM | DIASTOLIC BLOOD PRESSURE: 70 MMHG | RESPIRATION RATE: 18 BRPM | TEMPERATURE: 98.7 F | SYSTOLIC BLOOD PRESSURE: 122 MMHG | BODY MASS INDEX: 24.62 KG/M2 | WEIGHT: 130.4 LBS | HEIGHT: 61 IN

## 2019-07-19 DIAGNOSIS — J06.9 URI WITH COUGH AND CONGESTION: Primary | ICD-10-CM

## 2019-07-19 PROCEDURE — 1036F TOBACCO NON-USER: CPT | Performed by: NURSE PRACTITIONER

## 2019-07-19 PROCEDURE — 99213 OFFICE O/P EST LOW 20 MIN: CPT | Performed by: NURSE PRACTITIONER

## 2019-07-19 PROCEDURE — 3008F BODY MASS INDEX DOCD: CPT | Performed by: NURSE PRACTITIONER

## 2019-07-19 RX ORDER — AZITHROMYCIN 250 MG/1
TABLET, FILM COATED ORAL
Qty: 6 TABLET | Refills: 0 | Status: SHIPPED | OUTPATIENT
Start: 2019-07-19 | End: 2019-07-23

## 2019-07-19 NOTE — PROGRESS NOTES
[facility]        NAME: Matty Mir is a 29 y o  female  : 1990    MRN: [id number]  DATE: 2019  TIME: 1:52 PM    Assessment and Plan   URI with cough and congestion [J06 9]  1  URI with cough and congestion  azithromycin (ZITHROMAX) 250 mg tablet         Patient Instructions     Patient Instructions   Discussed viral vs bacterial infection  Zithromax as ordered  Continue OTC cough/cold medications as directed  Call or return for problems/concerns            Chief Complaint     Chief Complaint   Patient presents with    Cough     with phlegm for 1 week  History of Present Illness       Cough, post nasal drip, congestion x 1 week  Worse at night  Cough is productive with green phlegm Denies fever  Other symptoms include SOB, wheezing, chest tightness  Taking OTC cough medication with some relief  Review of Systems   Review of Systems   Constitutional: Negative for activity change, chills and fever  HENT: Positive for congestion, postnasal drip, rhinorrhea, sinus pain and sore throat  Respiratory: Positive for cough, chest tightness, shortness of breath and wheezing  Cardiovascular: Negative for chest pain  Gastrointestinal: Negative for diarrhea, nausea and vomiting  Neurological: Negative for dizziness and weakness           Current Medications       Current Outpatient Medications:     etonogestrel (NEXPLANON) subdermal implant, Inject 1 each under the skin, Disp: , Rfl:     ibuprofen (MOTRIN) 600 mg tablet, Take 1 tablet (600 mg total) by mouth every 6 (six) hours as needed for mild pain, Disp: 90 tablet, Rfl: 1    azithromycin (ZITHROMAX) 250 mg tablet, Take 2 tablets today then 1 tablet daily x 4 days, Disp: 6 tablet, Rfl: 0    Current Allergies     Allergies as of 2019 - Reviewed 2019   Allergen Reaction Noted    Diphenhydramine Rash, Shortness Of Breath, and Swelling 2015            The following portions of the patient's history were reviewed and updated as appropriate: allergies, current medications, past family history, past medical history, past social history, past surgical history and problem list      Past Medical History:   Diagnosis Date    Chest pain     resolved 12/5/16    Congenital heart disease     Exudative tonsillitis     last assessed 9/20/16  documented resolved 12/5/16    Mitral valve prolapse     last assessed 3/26/15    Mitral valve regurgitation        Past Surgical History:   Procedure Laterality Date    ATRIOVENTRICULAR CANAL REPAIR      resolved    CARDIAC SURGERY         Family History   Problem Relation Age of Onset    Throat cancer Mother     Alcohol abuse Mother     Hypertension Father     Hyperlipidemia Father     Heart murmur Father     Hypertension Maternal Grandfather     Diabetes Maternal Grandfather     Mental illness Family     Substance Abuse Neg Hx         family history         Medications have been verified  Objective   /70 (BP Location: Right arm, Patient Position: Sitting, Cuff Size: Standard)   Pulse 71   Temp 98 7 °F (37 1 °C) (Tympanic)   Resp 18   Ht 5' 1" (1 549 m)   Wt 59 1 kg (130 lb 6 4 oz)   LMP 06/19/2019 (Within Days)   SpO2 99%   Breastfeeding?  No   BMI 24 64 kg/m²        Physical Exam     Physical Exam

## 2019-07-19 NOTE — PATIENT INSTRUCTIONS
Discussed viral vs bacterial infection  Zithromax as ordered  Continue OTC cough/cold medications as directed  Call or return for problems/concerns

## 2019-09-10 ENCOUNTER — TELEPHONE (OUTPATIENT)
Dept: FAMILY MEDICINE CLINIC | Facility: CLINIC | Age: 29
End: 2019-09-10

## 2019-09-10 NOTE — TELEPHONE ENCOUNTER
Fax came through requesting insurance referral, back date it for 08/27/19  NPI: [id number]  ICD-10: Q21 2 , I08 0    Please see information bellow for insurance referral:    Referral #: [id number]  Effective: 09/10/2019  Expires: 12/08/2019     Referral faxed to [phone]

## 2019-09-26 ENCOUNTER — OFFICE VISIT (OUTPATIENT)
Dept: FAMILY MEDICINE CLINIC | Facility: CLINIC | Age: 29
End: 2019-09-26
Payer: COMMERCIAL

## 2019-09-26 VITALS
BODY MASS INDEX: 25.14 KG/M2 | OXYGEN SATURATION: 98 % | HEART RATE: 84 BPM | WEIGHT: 133.16 LBS | HEIGHT: 61 IN | SYSTOLIC BLOOD PRESSURE: 112 MMHG | TEMPERATURE: 98.1 F | DIASTOLIC BLOOD PRESSURE: 68 MMHG

## 2019-09-26 DIAGNOSIS — J02.0 STREP PHARYNGITIS: Primary | ICD-10-CM

## 2019-09-26 DIAGNOSIS — J02.9 SORE THROAT: ICD-10-CM

## 2019-09-26 PROBLEM — M41.20 SCOLIOSIS (AND KYPHOSCOLIOSIS), IDIOPATHIC: Status: ACTIVE | Noted: 2017-08-10

## 2019-09-26 PROBLEM — Q24.9 MATERNAL CONGENITAL CARDIAC ANOMALY COMPLICATING PREGNANCY: Status: ACTIVE | Noted: 2017-12-14

## 2019-09-26 PROBLEM — I08.0 MITRAL VALVE INSUFFICIENCY AND AORTIC VALVE INSUFFICIENCY: Status: ACTIVE | Noted: 2019-09-26

## 2019-09-26 PROBLEM — K43.9 VENTRAL HERNIA WITHOUT OBSTRUCTION OR GANGRENE: Status: ACTIVE | Noted: 2017-08-10

## 2019-09-26 PROBLEM — O99.891 MATERNAL CONGENITAL CARDIAC ANOMALY COMPLICATING PREGNANCY: Status: ACTIVE | Noted: 2017-12-14

## 2019-09-26 PROBLEM — Q21.21: Status: ACTIVE | Noted: 2019-09-26

## 2019-09-26 PROBLEM — G43.909 MIGRAINE: Status: ACTIVE | Noted: 2019-09-26

## 2019-09-26 PROBLEM — Q21.2: Status: ACTIVE | Noted: 2019-09-26

## 2019-09-26 PROBLEM — N92.1 BREAKTHROUGH BLEEDING ON NEXPLANON: Status: ACTIVE | Noted: 2019-06-26

## 2019-09-26 PROBLEM — Z97.5 BREAKTHROUGH BLEEDING ON NEXPLANON: Status: ACTIVE | Noted: 2019-06-26

## 2019-09-26 LAB — S PYO AG THROAT QL: POSITIVE

## 2019-09-26 PROCEDURE — 99213 OFFICE O/P EST LOW 20 MIN: CPT | Performed by: FAMILY MEDICINE

## 2019-09-26 PROCEDURE — 3008F BODY MASS INDEX DOCD: CPT | Performed by: FAMILY MEDICINE

## 2019-09-26 PROCEDURE — 87880 STREP A ASSAY W/OPTIC: CPT | Performed by: FAMILY MEDICINE

## 2019-09-26 RX ORDER — AMOXICILLIN 500 MG/1
500 CAPSULE ORAL 3 TIMES DAILY
Qty: 30 CAPSULE | Refills: 0 | Status: SHIPPED | OUTPATIENT
Start: 2019-09-26 | End: 2019-10-06

## 2019-09-26 NOTE — PROGRESS NOTES
[address]         NAME: Anisa Turner is a 29 y o  female  : 1990    MRN: [id number]  DATE: 2019  TIME: 10:13 AM    Assessment and Plan   Strep pharyngitis [J02 0]  1  Strep pharyngitis  amoxicillin (AMOXIL) 500 mg capsule   2  Sore throat  POCT rapid strepA       No problem-specific Assessment & Plan notes found for this encounter  Patient Instructions     There are no Patient Instructions on file for this visit  Chief Complaint     Chief Complaint   Patient presents with    Sore Throat     past two days         History of Present Illness       Patient presents with complaints of sore throat over the last 24 hours  Symptoms are isolated to the throat  There is low-grade fevers last night  There is some swollen glands anteriorly  Patient does have a history of strep throat in the past   Rapid strep testing today in the office is positive  Review of Systems   Review of Systems   Constitutional: Negative for appetite change, chills, diaphoresis and fever  HENT: Positive for sore throat and trouble swallowing  Negative for ear pain, rhinorrhea and sinus pressure  Eyes: Negative for discharge, redness and itching  Respiratory: Negative for cough, shortness of breath and wheezing  Cardiovascular: Negative for chest pain and palpitations  Rapid or slow heart rate   Gastrointestinal: Negative for abdominal pain, diarrhea, nausea and vomiting           Current Medications       Current Outpatient Medications:     etonogestrel (NEXPLANON) subdermal implant, Inject 1 each under the skin, Disp: , Rfl:     ibuprofen (MOTRIN) 600 mg tablet, Take 1 tablet (600 mg total) by mouth every 6 (six) hours as needed for mild pain, Disp: 90 tablet, Rfl: 1    amoxicillin (AMOXIL) 500 mg capsule, Take 1 capsule (500 mg total) by mouth 3 (three) times a day for 10 days please complete entire course of the antibiotic, Disp: 30 capsule, Rfl: 0    Current Allergies     Allergies as of 09/26/2019 - Reviewed 09/26/2019   Allergen Reaction Noted    Diphenhydramine Rash, Shortness Of Breath, and Swelling 04/08/2015            The following portions of the patient's history were reviewed and updated as appropriate: allergies, current medications, past family history, past medical history, past social history, past surgical history and problem list      Past Medical History:   Diagnosis Date    Chest pain     resolved 12/5/16    Congenital heart disease     Exudative tonsillitis     last assessed 9/20/16  documented resolved 12/5/16    Mitral valve prolapse     last assessed 3/26/15    Mitral valve regurgitation        Past Surgical History:   Procedure Laterality Date    ATRIOVENTRICULAR CANAL REPAIR      resolved    CARDIAC SURGERY         Family History   Problem Relation Age of Onset    Throat cancer Mother     Alcohol abuse Mother     Hypertension Father     Hyperlipidemia Father     Heart murmur Father     Hypertension Maternal Grandfather     Diabetes Maternal Grandfather     Mental illness Family     Substance Abuse Neg Hx         family history         Medications have been verified  Objective   /68 (BP Location: Right arm, Patient Position: Sitting, Cuff Size: Child)   Pulse 84   Temp 98 1 °F (36 7 °C) (Tympanic)   Ht 5' 1" (1 549 m)   Wt 60 4 kg (133 lb 2 5 oz)   SpO2 98%   BMI 25 16 kg/m²        Physical Exam     Physical Exam   Constitutional: She appears well-developed and well-nourished  No distress  HENT:   Head: Normocephalic and atraumatic  Right Ear: Tympanic membrane and external ear normal  No drainage  Left Ear: Tympanic membrane normal  No drainage  Mouth/Throat: Posterior oropharyngeal erythema present  No oropharyngeal exudate or tonsillar abscesses  Tonsils are 2+ on the right  Tonsils are 2+ on the left  No tonsillar exudate     Eyes: Conjunctivae and EOM are normal  Right eye exhibits no discharge  Left eye exhibits no discharge  Neck: Normal range of motion  Neck supple  No thyromegaly present  Cardiovascular: Normal rate, regular rhythm and normal heart sounds  Pulmonary/Chest: Effort normal  No respiratory distress  She has no wheezes  She has no rales  Lymphadenopathy:     She has no cervical adenopathy  Right cervical: No superficial cervical adenopathy present  Left cervical: No superficial cervical adenopathy present  BMI Counseling: Body mass index is 25 16 kg/m²  The BMI is above normal  Nutrition recommendations include reducing portion sizes, decreasing overall calorie intake and 3-5 servings of fruits/vegetables daily

## 2019-10-16 DIAGNOSIS — R51.9 CHRONIC NONINTRACTABLE HEADACHE, UNSPECIFIED HEADACHE TYPE: ICD-10-CM

## 2019-10-16 DIAGNOSIS — G89.29 CHRONIC NONINTRACTABLE HEADACHE, UNSPECIFIED HEADACHE TYPE: ICD-10-CM

## 2019-10-16 RX ORDER — IBUPROFEN 600 MG/1
TABLET ORAL
Qty: 90 TABLET | Refills: 1 | Status: SHIPPED | OUTPATIENT
Start: 2019-10-16 | End: 2021-02-02 | Stop reason: SDUPTHER

## 2020-01-04 ENCOUNTER — APPOINTMENT (EMERGENCY)
Dept: RADIOLOGY | Facility: HOSPITAL | Age: 30
End: 2020-01-04
Payer: COMMERCIAL

## 2020-01-04 ENCOUNTER — HOSPITAL ENCOUNTER (EMERGENCY)
Facility: HOSPITAL | Age: 30
Discharge: HOME | End: 2020-01-04
Attending: EMERGENCY MEDICINE
Payer: COMMERCIAL

## 2020-01-04 VITALS
RESPIRATION RATE: 17 BRPM | OXYGEN SATURATION: 98 % | WEIGHT: 131 LBS | DIASTOLIC BLOOD PRESSURE: 61 MMHG | SYSTOLIC BLOOD PRESSURE: 129 MMHG | TEMPERATURE: 98.6 F | BODY MASS INDEX: 24.75 KG/M2 | HEART RATE: 70 BPM

## 2020-01-04 DIAGNOSIS — R07.89 ATYPICAL CHEST PAIN: Primary | ICD-10-CM

## 2020-01-04 LAB
ANION GAP SERPL CALCULATED.3IONS-SCNC: 8 MMOL/L (ref 4–13)
BASOPHILS # BLD AUTO: 0.02 THOUSANDS/ΜL (ref 0–0.1)
BASOPHILS NFR BLD AUTO: 0 % (ref 0–1)
BUN SERPL-MCNC: 18 MG/DL (ref 5–25)
CALCIUM SERPL-MCNC: 9.2 MG/DL (ref 8.3–10.1)
CHLORIDE SERPL-SCNC: 100 MMOL/L (ref 100–108)
CO2 SERPL-SCNC: 26 MMOL/L (ref 21–32)
CREAT SERPL-MCNC: 0.65 MG/DL (ref 0.6–1.3)
D DIMER PPP FEU-MCNC: <0.27 UG/ML FEU
EOSINOPHIL # BLD AUTO: 0.07 THOUSAND/ΜL (ref 0–0.61)
EOSINOPHIL NFR BLD AUTO: 1 % (ref 0–6)
ERYTHROCYTE [DISTWIDTH] IN BLOOD BY AUTOMATED COUNT: 12 % (ref 11.6–15.1)
EXT PREG TEST URINE: NEGATIVE
EXT. CONTROL ED NAV: NORMAL
GFR SERPL CREATININE-BSD FRML MDRD: 120 ML/MIN/1.73SQ M
GLUCOSE SERPL-MCNC: 89 MG/DL (ref 65–140)
HCT VFR BLD AUTO: 42.1 % (ref 34.8–46.1)
HGB BLD-MCNC: 14.5 G/DL (ref 11.5–15.4)
IMM GRANULOCYTES # BLD AUTO: 0.02 THOUSAND/UL (ref 0–0.2)
IMM GRANULOCYTES NFR BLD AUTO: 0 % (ref 0–2)
LYMPHOCYTES # BLD AUTO: 1.73 THOUSANDS/ΜL (ref 0.6–4.47)
LYMPHOCYTES NFR BLD AUTO: 26 % (ref 14–44)
MCH RBC QN AUTO: 30.5 PG (ref 26.8–34.3)
MCHC RBC AUTO-ENTMCNC: 34.4 G/DL (ref 31.4–37.4)
MCV RBC AUTO: 88 FL (ref 82–98)
MONOCYTES # BLD AUTO: 0.4 THOUSAND/ΜL (ref 0.17–1.22)
MONOCYTES NFR BLD AUTO: 6 % (ref 4–12)
NEUTROPHILS # BLD AUTO: 4.44 THOUSANDS/ΜL (ref 1.85–7.62)
NEUTS SEG NFR BLD AUTO: 67 % (ref 43–75)
NRBC BLD AUTO-RTO: 0 /100 WBCS
PLATELET # BLD AUTO: 207 THOUSANDS/UL (ref 149–390)
PMV BLD AUTO: 10.4 FL (ref 8.9–12.7)
POTASSIUM SERPL-SCNC: 3.5 MMOL/L (ref 3.5–5.3)
RBC # BLD AUTO: 4.76 MILLION/UL (ref 3.81–5.12)
SODIUM SERPL-SCNC: 134 MMOL/L (ref 136–145)
TROPONIN I SERPL-MCNC: <0.02 NG/ML
WBC # BLD AUTO: 6.68 THOUSAND/UL (ref 4.31–10.16)

## 2020-01-04 PROCEDURE — 99285 EMERGENCY DEPT VISIT HI MDM: CPT

## 2020-01-04 PROCEDURE — 85025 COMPLETE CBC W/AUTO DIFF WBC: CPT | Performed by: EMERGENCY MEDICINE

## 2020-01-04 PROCEDURE — 85379 FIBRIN DEGRADATION QUANT: CPT | Performed by: EMERGENCY MEDICINE

## 2020-01-04 PROCEDURE — 84484 ASSAY OF TROPONIN QUANT: CPT | Performed by: EMERGENCY MEDICINE

## 2020-01-04 PROCEDURE — 81025 URINE PREGNANCY TEST: CPT | Performed by: EMERGENCY MEDICINE

## 2020-01-04 PROCEDURE — 80048 BASIC METABOLIC PNL TOTAL CA: CPT | Performed by: EMERGENCY MEDICINE

## 2020-01-04 PROCEDURE — 99285 EMERGENCY DEPT VISIT HI MDM: CPT | Performed by: EMERGENCY MEDICINE

## 2020-01-04 PROCEDURE — 36415 COLL VENOUS BLD VENIPUNCTURE: CPT | Performed by: EMERGENCY MEDICINE

## 2020-01-04 PROCEDURE — 71046 X-RAY EXAM CHEST 2 VIEWS: CPT

## 2020-01-04 PROCEDURE — 93005 ELECTROCARDIOGRAM TRACING: CPT

## 2020-01-05 LAB
ATRIAL RATE: 82 BPM
P AXIS: 31 DEGREES
PR INTERVAL: 162 MS
QRS AXIS: -61 DEGREES
QRSD INTERVAL: 122 MS
QT INTERVAL: 414 MS
QTC INTERVAL: 483 MS
T WAVE AXIS: -28 DEGREES
VENTRICULAR RATE: 82 BPM

## 2020-01-05 PROCEDURE — 93010 ELECTROCARDIOGRAM REPORT: CPT | Performed by: INTERNAL MEDICINE

## 2020-01-05 NOTE — ED NOTES
Pt ambulated to rest room in room to provide urine sample  No gait disturbances noted        Antonietta Guy RN  01/04/20 2042

## 2020-01-07 NOTE — ED PROVIDER NOTES
History  Chief Complaint   Patient presents with    Chest Pain     Sent in from [facility] for abnormal EKG where she went with c/o three days of chest tightness across chest and "weird stabbing pain under my boob" on left side  Denies nausea/vomiting/sob/diaphoresis     34 yof with hx of mitral valve prolapse, AV canal repair, p/w left sided chest pain  Onset 3 days ago  Sharp  Somewhat pleuritc  Waxing and waning  Worse tonight so went to urgent care, told she had an abnormal EKG  Sent to ED for eval  No other c/o  Not dypsneic  No orthopnea  No exertional c/o, f/c  On exam she has a mid sys murmur  No click  No edema or jvd  Lungs clear    A/P: chest pain  Is on birth control, will ddimer  Cardiac hx but not ischemic, but no signs of MVP issues  Trop/ekg  ekg from urgent care reviewed and NSR with incomplete RBBB  EKG done here the same, NSR with RBBB rate of 74          Prior to Admission Medications   Prescriptions Last Dose Informant Patient Reported?  Taking?   etonogestrel (Cassandria Kerwin) subdermal implant   Yes No   Sig: Inject 1 each under the skin   ibuprofen (MOTRIN) 600 mg tablet   No No   Sig: TAKE 1 TABLET BY MOUTH EVERY 6 HOURS AS NEEDED FOR MILD PAIN      Facility-Administered Medications: None       Past Medical History:   Diagnosis Date    Chest pain     resolved 12/5/16    Congenital heart disease     Exudative tonsillitis     last assessed 9/20/16  documented resolved 12/5/16    Mitral valve prolapse     last assessed 3/26/15    Mitral valve regurgitation        Past Surgical History:   Procedure Laterality Date    ATRIOVENTRICULAR CANAL REPAIR      resolved    CARDIAC SURGERY         Family History   Problem Relation Age of Onset    Throat cancer Mother     Alcohol abuse Mother     Hypertension Father     Hyperlipidemia Father     Heart murmur Father     Hypertension Maternal Grandfather     Diabetes Maternal Grandfather     Mental illness Family     Substance Abuse Neg Hx family history     I have reviewed and agree with the history as documented  Social History     Tobacco Use    Smoking status: Never Smoker    Smokeless tobacco: Never Used   Substance Use Topics    Alcohol use: Yes     Comment: documented remote social alcohol use per allscripts    Drug use: No        Review of Systems   Constitutional: Negative for chills, fatigue and fever  Eyes: Negative for photophobia and visual disturbance  Respiratory: Negative for cough and shortness of breath  Cardiovascular: Positive for chest pain  Negative for palpitations and leg swelling  Gastrointestinal: Negative for diarrhea, nausea and vomiting  Endocrine: Negative for polydipsia and polyuria  Genitourinary: Negative for decreased urine volume, difficulty urinating, dysuria and frequency  Musculoskeletal: Negative for back pain, neck pain and neck stiffness  Skin: Negative for color change and rash  Allergic/Immunologic: Negative for environmental allergies and immunocompromised state  Neurological: Negative for dizziness and headaches  Hematological: Negative for adenopathy  Does not bruise/bleed easily  Psychiatric/Behavioral: Negative for dysphoric mood  The patient is not nervous/anxious  Physical Exam  Physical Exam   Constitutional: She is oriented to person, place, and time  She appears well-developed and well-nourished  No distress  HENT:   Head: Normocephalic and atraumatic  Nose: Nose normal    Eyes: Pupils are equal, round, and reactive to light  Conjunctivae and EOM are normal  No scleral icterus  Neck: Normal range of motion  Neck supple  No JVD present  No tracheal deviation present  No thyromegaly present  Cardiovascular: Normal rate, regular rhythm, normal heart sounds and intact distal pulses  Exam reveals no gallop and no friction rub  Pulmonary/Chest: Effort normal and breath sounds normal  No respiratory distress  She has no wheezes  She has no rales   She exhibits no tenderness  Abdominal: Soft  Bowel sounds are normal  She exhibits no distension and no mass  There is no tenderness  There is no rebound and no guarding  No hernia  Musculoskeletal: Normal range of motion  She exhibits no edema, tenderness or deformity  Neurological: She is alert and oriented to person, place, and time  She has normal reflexes  No cranial nerve deficit  Coordination normal    Skin: Skin is warm and dry  She is not diaphoretic  No erythema  Psychiatric: She has a normal mood and affect  Her behavior is normal    Nursing note and vitals reviewed        Vital Signs  ED Triage Vitals   Temperature Pulse Respirations Blood Pressure SpO2   01/04/20 2025 01/04/20 2025 01/04/20 2025 01/04/20 2025 01/04/20 2025   98 6 °F (37 °C) 80 15 (!) 173/85 100 %      Temp src Heart Rate Source Patient Position - Orthostatic VS BP Location FiO2 (%)   -- 01/04/20 2025 01/04/20 2130 01/04/20 2130 --    Monitor Lying Right arm       Pain Score       01/04/20 2025       No Pain           Vitals:    01/04/20 2025 01/04/20 2130   BP: (!) 173/85 129/61   Pulse: 80 70   Patient Position - Orthostatic VS:  Lying         Visual Acuity      ED Medications  Medications - No data to display    Diagnostic Studies  Results Reviewed     Procedure Component Value Units Date/Time    D-Dimer [[id number]]  (Normal) Collected:  01/04/20 2035    Lab Status:  Final result Specimen:  Blood from Arm, Left Updated:  01/04/20 2121     D-Dimer, Quant <0 27 ug/ml FEU     Narrative:       No clots    Troponin I [[id number]]  (Normal) Collected:  01/04/20 2035    Lab Status:  Final result Specimen:  Blood from Arm, Left Updated:  01/04/20 2105     Troponin I <0 02 ng/mL     Basic metabolic panel [[id number]]  (Abnormal) Collected:  01/04/20 2035    Lab Status:  Final result Specimen:  Blood from Arm, Left Updated:  01/04/20 2055     Sodium 134 mmol/L      Potassium 3 5 mmol/L      Chloride 100 mmol/L      CO2 26 mmol/L      ANION GAP 8 mmol/L      BUN 18 mg/dL      Creatinine 0 65 mg/dL      Glucose 89 mg/dL      Calcium 9 2 mg/dL      eGFR 120 ml/min/1 73sq m     Narrative:       [place] guidelines for Chronic Kidney Disease (CKD):     Stage 1 with normal or high GFR (GFR > 90 mL/min/1 73 square meters)    Stage 2 Mild CKD (GFR = 60-89 mL/min/1 73 square meters)    Stage 3A Moderate CKD (GFR = 45-59 mL/min/1 73 square meters)    Stage 3B Moderate CKD (GFR = 30-44 mL/min/1 73 square meters)    Stage 4 Severe CKD (GFR = 15-29 mL/min/1 73 square meters)    Stage 5 End Stage CKD (GFR <15 mL/min/1 73 square meters)  Note: GFR calculation is accurate only with a steady state creatinine    POCT pregnancy, urine [[id number]]  (Normal) Resulted:  01/04/20 2050    Lab Status:  Final result Updated:  01/04/20 2050     EXT PREG TEST UR (Ref: Negative) negative     Control valid    CBC and differential [[id number]] Collected:  01/04/20 2035    Lab Status:  Final result Specimen:  Blood from Arm, Left Updated:  01/04/20 2043     WBC 6 68 Thousand/uL      RBC 4 76 Million/uL      Hemoglobin 14 5 g/dL      Hematocrit 42 1 %      MCV 88 fL      MCH 30 5 pg      MCHC 34 4 g/dL      RDW 12 0 %      MPV 10 4 fL      Platelets 139 Thousands/uL      nRBC 0 /100 WBCs      Neutrophils Relative 67 %      Immat GRANS % 0 %      Lymphocytes Relative 26 %      Monocytes Relative 6 %      Eosinophils Relative 1 %      Basophils Relative 0 %      Neutrophils Absolute 4 44 Thousands/µL      Immature Grans Absolute 0 02 Thousand/uL      Lymphocytes Absolute 1 73 Thousands/µL      Monocytes Absolute 0 40 Thousand/µL      Eosinophils Absolute 0 07 Thousand/µL      Basophils Absolute 0 02 Thousands/µL                  XR chest 2 views   Final Result by Julianna Goodwin MD (01/05 1920)      No acute consolidation or congestion            Workstation performed: [id number]                    Procedures  Procedures         ED Course         HEART Risk Score      Most Recent Value   History  0 Filed at: 01/06/2020 2154   ECG  0 Filed at: 01/06/2020 2154   Age  0 Filed at: 01/06/2020 2154   Risk Factors  0 Filed at: 01/06/2020 2154   Troponin  0 Filed at: 01/06/2020 2154   Heart Score Risk Calculator   History  0 Filed at: 01/06/2020 2154   ECG  0 Filed at: 01/06/2020 2154   Age  0 Filed at: 01/06/2020 2154   Risk Factors  0 Filed at: 01/06/2020 2154   Troponin  0 Filed at: 01/06/2020 2154   HEART Score  0 Filed at: 01/06/2020 2154   HEART Score  0 Filed at: 01/06/2020 2154                            MDM  Number of Diagnoses or Management Options  Atypical chest pain: new and requires workup  Diagnosis management comments: Ddimer, trop neg  Likely pleurisy vs cosochondritis  Reviewed echo from earlier this year  Essentially normal other valve prolapse       Amount and/or Complexity of Data Reviewed  Clinical lab tests: reviewed and ordered  Tests in the radiology section of CPT®: ordered and reviewed  Tests in the medicine section of CPT®: ordered and reviewed  Review and summarize past medical records: yes  Independent visualization of images, tracings, or specimens: yes    Patient Progress  Patient progress: stable        Disposition  Final diagnoses:   Atypical chest pain     Time reflects when diagnosis was documented in both MDM as applicable and the Disposition within this note     Time User Action Codes Description Comment    1/4/2020  9:50 PM Hiren Gay Add [R07 89] Atypical chest pain       ED Disposition     ED Disposition Condition Date/Time Comment    Discharge Stable Sat Jan 4, 2020  9:50 PM Eunice Escamilla discharge to home/self care              Follow-up Information     Follow up With Specialties Details Why Contact Info    Emilia Mosquera MD Family Medicine Schedule an appointment as soon as possible for a visit  As needed [address]  [address] [address]            Discharge Medication List as of 1/4/2020  9:50 PM CONTINUE these medications which have NOT CHANGED    Details   etonogestrel (NEXPLANON) subdermal implant Inject 1 each under the skin, Historical Med      ibuprofen (MOTRIN) 600 mg tablet TAKE 1 TABLET BY MOUTH EVERY 6 HOURS AS NEEDED FOR MILD PAIN, Normal           No discharge procedures on file      ED Provider  Electronically Signed by           Marlo Hicks DO  01/06/20 5580

## 2020-01-10 ENCOUNTER — OFFICE VISIT (OUTPATIENT)
Dept: FAMILY MEDICINE CLINIC | Facility: CLINIC | Age: 30
End: 2020-01-10
Payer: COMMERCIAL

## 2020-01-10 VITALS
HEART RATE: 76 BPM | SYSTOLIC BLOOD PRESSURE: 112 MMHG | DIASTOLIC BLOOD PRESSURE: 62 MMHG | HEIGHT: 61 IN | RESPIRATION RATE: 16 BRPM | WEIGHT: 132 LBS | BODY MASS INDEX: 24.92 KG/M2

## 2020-01-10 DIAGNOSIS — F41.9 ANXIETY: Primary | ICD-10-CM

## 2020-01-10 PROCEDURE — 1036F TOBACCO NON-USER: CPT | Performed by: FAMILY MEDICINE

## 2020-01-10 PROCEDURE — 3008F BODY MASS INDEX DOCD: CPT | Performed by: FAMILY MEDICINE

## 2020-01-10 PROCEDURE — 99214 OFFICE O/P EST MOD 30 MIN: CPT | Performed by: FAMILY MEDICINE

## 2020-01-10 RX ORDER — ESCITALOPRAM OXALATE 10 MG/1
10 TABLET ORAL DAILY
Qty: 90 TABLET | Refills: 3 | Status: SHIPPED | OUTPATIENT
Start: 2020-01-10 | End: 2020-02-24 | Stop reason: SDUPTHER

## 2020-01-10 NOTE — PROGRESS NOTES
[facility]        NAME: Ariel Farnsworth is a 34 y o  female  : 1990    MRN: [id number]  DATE: January 10, 2020  TIME: 8:33 AM    Assessment and Plan   Anxiety [F41 9]  1  Anxiety  escitalopram (LEXAPRO) 10 mg tablet         Patient Instructions     Patient Instructions   Start Lexapro--? zoloft if no resp          Chief Complaint     Chief Complaint   Patient presents with    Anxiety         History of Present Illness       C/o anxiety--sev mos--mod severity--no resp OTC meds      Review of Systems   Review of Systems   Constitutional: Negative for fatigue, fever and unexpected weight change  HENT: Negative for congestion, sinus pain and sore throat  Eyes: Negative for visual disturbance  Respiratory: Negative for shortness of breath and wheezing  Cardiovascular: Negative for chest pain and palpitations  Gastrointestinal: Negative for abdominal pain, nausea and vomiting  Musculoskeletal: Negative  Negative for arthralgias and myalgias  Neurological: Negative for syncope, weakness and numbness  Psychiatric/Behavioral: Positive for sleep disturbance  Negative for confusion, dysphoric mood and suicidal ideas  The patient is nervous/anxious            Current Medications       Current Outpatient Medications:     etonogestrel (NEXPLANON) subdermal implant, Inject 1 each under the skin, Disp: , Rfl:     ibuprofen (MOTRIN) 600 mg tablet, TAKE 1 TABLET BY MOUTH EVERY 6 HOURS AS NEEDED FOR MILD PAIN, Disp: 90 tablet, Rfl: 1    escitalopram (LEXAPRO) 10 mg tablet, Take 1 tablet (10 mg total) by mouth daily, Disp: 90 tablet, Rfl: 3    Current Allergies     Allergies as of 01/10/2020 - Reviewed 01/10/2020   Allergen Reaction Noted    Diphenhydramine Rash, Shortness Of Breath, and Swelling 2015            The following portions of the patient's history were reviewed and updated as appropriate: allergies, current medications, past family history, past medical history, past social history, past surgical history and problem list      Past Medical History:   Diagnosis Date    Chest pain     resolved 12/5/16    Congenital heart disease     Exudative tonsillitis     last assessed 9/20/16  documented resolved 12/5/16    Mitral valve prolapse     last assessed 3/26/15    Mitral valve regurgitation        Past Surgical History:   Procedure Laterality Date    ATRIOVENTRICULAR CANAL REPAIR      resolved    CARDIAC SURGERY         Family History   Problem Relation Age of Onset    Throat cancer Mother     Alcohol abuse Mother     Hypertension Father     Hyperlipidemia Father     Heart murmur Father     Hypertension Maternal Grandfather     Diabetes Maternal Grandfather     Mental illness Family     Substance Abuse Neg Hx         family history         Medications have been verified  Objective   /62   Pulse 76   Resp 16   Ht 5' 1" (1 549 m)   Wt 59 9 kg (132 lb)   LMP 01/01/2020   BMI 24 94 kg/m²        Physical Exam     Physical Exam   Constitutional: She is oriented to person, place, and time  Vital signs are normal  She appears well-developed and well-nourished  HENT:   Right Ear: Ear canal normal  Tympanic membrane is not injected  Left Ear: Ear canal normal  Tympanic membrane is not injected  Nose: Nose normal    Mouth/Throat: Oropharynx is clear and moist    Eyes: Pupils are equal, round, and reactive to light  Conjunctivae and EOM are normal  Right eye exhibits no discharge  Left eye exhibits no discharge  Neck: Normal range of motion  Neck supple  No thyromegaly present  Cardiovascular: Normal rate, regular rhythm and normal heart sounds  No murmur heard  Pulmonary/Chest: Effort normal and breath sounds normal  No respiratory distress  She has no wheezes  Abdominal: Soft  Bowel sounds are normal  She exhibits no distension  There is no tenderness  Musculoskeletal: Normal range of motion     Lymphadenopathy:     She has no cervical adenopathy  Neurological: She is alert and oriented to person, place, and time  She has normal strength and normal reflexes  She is not disoriented  No sensory deficit  Gait normal    Skin: Skin is warm and dry  Psychiatric: She has a normal mood and affect   Her speech is normal and behavior is normal  Judgment and thought content normal  Cognition and memory are normal

## 2020-02-24 DIAGNOSIS — F41.9 ANXIETY: ICD-10-CM

## 2020-02-24 RX ORDER — ESCITALOPRAM OXALATE 10 MG/1
20 TABLET ORAL DAILY
Qty: 90 TABLET | Refills: 3 | Status: SHIPPED | OUTPATIENT
Start: 2020-02-24 | End: 2020-09-08

## 2020-06-01 ENCOUNTER — TELEPHONE (OUTPATIENT)
Dept: FAMILY MEDICINE CLINIC | Facility: CLINIC | Age: 30
End: 2020-06-01

## 2020-06-03 ENCOUNTER — TELEPHONE (OUTPATIENT)
Dept: FAMILY MEDICINE CLINIC | Facility: CLINIC | Age: 30
End: 2020-06-03

## 2020-06-15 ENCOUNTER — TELEPHONE (OUTPATIENT)
Dept: OTHER | Facility: OTHER | Age: 30
End: 2020-06-15

## 2020-06-16 ENCOUNTER — TELEMEDICINE (OUTPATIENT)
Dept: FAMILY MEDICINE CLINIC | Facility: CLINIC | Age: 30
End: 2020-06-16
Payer: COMMERCIAL

## 2020-06-16 DIAGNOSIS — K42.9 UMBILICAL HERNIA WITHOUT OBSTRUCTION AND WITHOUT GANGRENE: Primary | ICD-10-CM

## 2020-06-16 PROCEDURE — 99213 OFFICE O/P EST LOW 20 MIN: CPT | Performed by: FAMILY MEDICINE

## 2020-06-25 ENCOUNTER — CONSULT (OUTPATIENT)
Dept: SURGERY | Facility: HOSPITAL | Age: 30
End: 2020-06-25
Payer: COMMERCIAL

## 2020-06-25 VITALS
HEIGHT: 61 IN | SYSTOLIC BLOOD PRESSURE: 123 MMHG | BODY MASS INDEX: 27.38 KG/M2 | DIASTOLIC BLOOD PRESSURE: 73 MMHG | TEMPERATURE: 99 F | WEIGHT: 145 LBS

## 2020-06-25 DIAGNOSIS — K42.9 UMBILICAL HERNIA WITHOUT OBSTRUCTION AND WITHOUT GANGRENE: ICD-10-CM

## 2020-06-25 DIAGNOSIS — K43.9 VENTRAL HERNIA WITHOUT OBSTRUCTION OR GANGRENE: Primary | ICD-10-CM

## 2020-06-25 DIAGNOSIS — K43.2 INCISIONAL HERNIA, WITHOUT OBSTRUCTION OR GANGRENE: ICD-10-CM

## 2020-06-25 PROCEDURE — 99243 OFF/OP CNSLTJ NEW/EST LOW 30: CPT | Performed by: SURGERY

## 2020-07-14 DIAGNOSIS — K42.9 UMBILICAL HERNIA WITHOUT OBSTRUCTION AND WITHOUT GANGRENE: ICD-10-CM

## 2020-07-14 DIAGNOSIS — K43.9 VENTRAL HERNIA WITHOUT OBSTRUCTION OR GANGRENE: ICD-10-CM

## 2020-07-14 PROCEDURE — U0003 INFECTIOUS AGENT DETECTION BY NUCLEIC ACID (DNA OR RNA); SEVERE ACUTE RESPIRATORY SYNDROME CORONAVIRUS 2 (SARS-COV-2) (CORONAVIRUS DISEASE [COVID-19]), AMPLIFIED PROBE TECHNIQUE, MAKING USE OF HIGH THROUGHPUT TECHNOLOGIES AS DESCRIBED BY CMS-2020-01-R: HCPCS

## 2020-07-17 NOTE — PRE-PROCEDURE INSTRUCTIONS
Pre-Surgery Instructions:   Medication Instructions    escitalopram (LEXAPRO) 10 mg tablet Instructed patient per Anesthesia Guidelines   etonogestrel (NEXPLANON) subdermal implant Instructed patient per Anesthesia Guidelines  Pre-op Showering Instructions for Surgery Patients    Before your operation, you play an important role in decreasing your risk for infection by washing with special antiseptic soap  This is an effective way to reduce bacteria on the skin which may help to prevent infections at the surgical site  Please read the following directions in advance  1  In the week before your operation, purchase a 4 ounce bottle of antiseptic soap containing chlorhexidine gluconate (CHG)  4%  Some brand names include: Aplicare®, Endure, and Hibiclens®  The cost is usually less than $5 00   For your convenience, the [address] [place] [address] carries the soap   It may also be available at your doctors office or pre-admission testing center, and at most retail pharmacies   If you are allergic or sensitive to soaps containing CHG, please let your doctor know so another antiseptic can be suggested   CHG antiseptic soap is for external use only  2  The day before your operation, follow these instructions carefully to get ready   Please clean linens (sheets) on your bed; you should sleep on clean sheets after your evening shower   Get clean towels and washcloth ready - you need enough for 2 showers   Set aside clean underwear, pajamas, and clothing to wear after the showers     Reminders:   DO NOT use any other soap or body rinse on your skin during or after the antiseptic showers   DO NOT use lotion, powder, deodorant, or perfume/aftershave of any kind on your skin after your antiseptic shower   DO NOT shave any body parts in the 24 hours/day before your operation   DO NOT get the antiseptic soap in your eyes, ears, nose, mouth, or vaginal area    3   You will need to shower the night before AND the morning of your surgery  Shower 1:   The first evening before the operation, take the first shower   First, shampoo your hair with regular shampoo and rinse it completely before you use the antiseptic soap  After washing and rinsing your hair, rinse your body   Next, use a clean washcloth to apply the antiseptic soap and wash your body from the neck down to your toes using ½ bottle of the antiseptic soap  You will use the other ½ bottle for the second shower   Clean the area where your incision will be; lather this area well for about 2 minutes   If you are having head or neck surgery, wash areas with the antiseptic soap   Rinse yourself completely with running water   Use a clean towel to dry off   Wear clean underwear and clothing/pajamas  Shower 2   The morning of your operation, take the second shower following the same steps as Shower 1 using the second ½ of the bottle of antiseptic soap   Use clean cloths and towels to wash and dry yourself   Wear clean underwear and clothing    You will receive a phone call from hospital for arrival time  Please call surgeons office if any changes in your condition  Wear easy on/off clothing; consider type of surgery;  valuables and jewelry please keep at home  **COVID-19  education done  Please: No contacts or eye make up or artificial eyelashes    Please bring special ordered sling or braces if needed for  Your particular surgery  n/a    Please secure transportation     Follow pre surgery showering or cleaning instructions as  Reviewed by nurse or surgeons office      Questions answered and concerns addressed

## 2020-07-21 ENCOUNTER — ANESTHESIA EVENT (OUTPATIENT)
Dept: PERIOP | Facility: HOSPITAL | Age: 30
End: 2020-07-21
Payer: COMMERCIAL

## 2020-07-21 ENCOUNTER — HOSPITAL ENCOUNTER (OUTPATIENT)
Facility: HOSPITAL | Age: 30
Setting detail: OUTPATIENT SURGERY
Discharge: HOME | End: 2020-07-21
Attending: SURGERY | Admitting: SURGERY
Payer: COMMERCIAL

## 2020-07-21 ENCOUNTER — ANESTHESIA (OUTPATIENT)
Dept: PERIOP | Facility: HOSPITAL | Age: 30
End: 2020-07-21
Payer: COMMERCIAL

## 2020-07-21 VITALS
HEART RATE: 90 BPM | SYSTOLIC BLOOD PRESSURE: 135 MMHG | OXYGEN SATURATION: 100 % | BODY MASS INDEX: 26.46 KG/M2 | DIASTOLIC BLOOD PRESSURE: 66 MMHG | WEIGHT: 143.8 LBS | HEIGHT: 62 IN | RESPIRATION RATE: 17 BRPM | TEMPERATURE: 97.5 F

## 2020-07-21 DIAGNOSIS — K43.9 VENTRAL HERNIA WITHOUT OBSTRUCTION OR GANGRENE: ICD-10-CM

## 2020-07-21 DIAGNOSIS — Z87.19 S/P HERNIA REPAIR: Primary | ICD-10-CM

## 2020-07-21 DIAGNOSIS — Z98.890 S/P HERNIA REPAIR: Primary | ICD-10-CM

## 2020-07-21 LAB
EXT PREGNANCY TEST URINE: NEGATIVE
EXT. CONTROL: NORMAL
SARS-COV-2 RNA RESP QL NAA+PROBE: NEGATIVE

## 2020-07-21 PROCEDURE — 49560 PR REPAIR INCISIONAL HERNIA,REDUCIBLE: CPT | Performed by: SURGERY

## 2020-07-21 PROCEDURE — 49560 PR REPAIR INCISIONAL HERNIA,REDUCIBLE: CPT | Performed by: PHYSICIAN ASSISTANT

## 2020-07-21 PROCEDURE — 87635 SARS-COV-2 COVID-19 AMP PRB: CPT | Performed by: SURGERY

## 2020-07-21 PROCEDURE — C1781 MESH (IMPLANTABLE): HCPCS | Performed by: SURGERY

## 2020-07-21 PROCEDURE — 49568 PR IMPLANT MESH HERNIA REPAIR/DEBRIDEMENT CLOSURE: CPT | Performed by: PHYSICIAN ASSISTANT

## 2020-07-21 PROCEDURE — 49568 PR IMPLANT MESH HERNIA REPAIR/DEBRIDEMENT CLOSURE: CPT | Performed by: SURGERY

## 2020-07-21 PROCEDURE — 81025 URINE PREGNANCY TEST: CPT | Performed by: SURGERY

## 2020-07-21 DEVICE — VENTRIO HERNIA PATCH OVAL
Type: IMPLANTABLE DEVICE | Site: ABDOMEN | Status: FUNCTIONAL
Brand: VENTRIO HERNIA PATCH

## 2020-07-21 RX ORDER — HYDROMORPHONE HCL/PF 1 MG/ML
SYRINGE (ML) INJECTION AS NEEDED
Status: DISCONTINUED | OUTPATIENT
Start: 2020-07-21 | End: 2020-07-21 | Stop reason: SURG

## 2020-07-21 RX ORDER — ACETAMINOPHEN 325 MG/1
650 TABLET ORAL EVERY 6 HOURS PRN
Status: DISCONTINUED | OUTPATIENT
Start: 2020-07-21 | End: 2020-07-21 | Stop reason: HOSPADM

## 2020-07-21 RX ORDER — ONDANSETRON 2 MG/ML
INJECTION INTRAMUSCULAR; INTRAVENOUS AS NEEDED
Status: DISCONTINUED | OUTPATIENT
Start: 2020-07-21 | End: 2020-07-21 | Stop reason: SURG

## 2020-07-21 RX ORDER — LIDOCAINE HYDROCHLORIDE 10 MG/ML
INJECTION, SOLUTION EPIDURAL; INFILTRATION; INTRACAUDAL; PERINEURAL AS NEEDED
Status: DISCONTINUED | OUTPATIENT
Start: 2020-07-21 | End: 2020-07-21 | Stop reason: SURG

## 2020-07-21 RX ORDER — SODIUM CHLORIDE, SODIUM LACTATE, POTASSIUM CHLORIDE, CALCIUM CHLORIDE 600; 310; 30; 20 MG/100ML; MG/100ML; MG/100ML; MG/100ML
75 INJECTION, SOLUTION INTRAVENOUS CONTINUOUS
Status: DISCONTINUED | OUTPATIENT
Start: 2020-07-21 | End: 2020-07-21 | Stop reason: HOSPADM

## 2020-07-21 RX ORDER — IBUPROFEN 600 MG/1
600 TABLET ORAL EVERY 6 HOURS PRN
Qty: 20 TABLET | Refills: 0 | Status: SHIPPED | OUTPATIENT
Start: 2020-07-21 | End: 2020-09-17 | Stop reason: SDUPTHER

## 2020-07-21 RX ORDER — KETOROLAC TROMETHAMINE 30 MG/ML
INJECTION, SOLUTION INTRAMUSCULAR; INTRAVENOUS AS NEEDED
Status: DISCONTINUED | OUTPATIENT
Start: 2020-07-21 | End: 2020-07-21 | Stop reason: SURG

## 2020-07-21 RX ORDER — MIDAZOLAM HYDROCHLORIDE 2 MG/2ML
INJECTION, SOLUTION INTRAMUSCULAR; INTRAVENOUS AS NEEDED
Status: DISCONTINUED | OUTPATIENT
Start: 2020-07-21 | End: 2020-07-21 | Stop reason: SURG

## 2020-07-21 RX ORDER — HYDROMORPHONE HCL/PF 1 MG/ML
0.5 SYRINGE (ML) INJECTION
Status: DISCONTINUED | OUTPATIENT
Start: 2020-07-21 | End: 2020-07-21 | Stop reason: HOSPADM

## 2020-07-21 RX ORDER — CEFAZOLIN SODIUM 2 G/50ML
SOLUTION INTRAVENOUS AS NEEDED
Status: DISCONTINUED | OUTPATIENT
Start: 2020-07-21 | End: 2020-07-21 | Stop reason: SURG

## 2020-07-21 RX ORDER — CEFAZOLIN SODIUM 2 G/50ML
2000 SOLUTION INTRAVENOUS ONCE
Status: CANCELLED | OUTPATIENT
Start: 2020-07-21 | End: 2020-07-21

## 2020-07-21 RX ORDER — DEXAMETHASONE SODIUM PHOSPHATE 10 MG/ML
INJECTION, SOLUTION INTRAMUSCULAR; INTRAVENOUS AS NEEDED
Status: DISCONTINUED | OUTPATIENT
Start: 2020-07-21 | End: 2020-07-21 | Stop reason: SURG

## 2020-07-21 RX ORDER — FENTANYL CITRATE 50 UG/ML
INJECTION, SOLUTION INTRAMUSCULAR; INTRAVENOUS AS NEEDED
Status: DISCONTINUED | OUTPATIENT
Start: 2020-07-21 | End: 2020-07-21 | Stop reason: SURG

## 2020-07-21 RX ORDER — FENTANYL CITRATE/PF 50 MCG/ML
25 SYRINGE (ML) INJECTION
Status: DISCONTINUED | OUTPATIENT
Start: 2020-07-21 | End: 2020-07-21 | Stop reason: HOSPADM

## 2020-07-21 RX ORDER — HYDROCODONE BITARTRATE AND ACETAMINOPHEN 5; 325 MG/1; MG/1
1-2 TABLET ORAL EVERY 6 HOURS PRN
Qty: 24 TABLET | Refills: 0 | Status: SHIPPED | OUTPATIENT
Start: 2020-07-21 | End: 2020-07-24

## 2020-07-21 RX ORDER — CEFAZOLIN SODIUM 2 G/50ML
2000 SOLUTION INTRAVENOUS ONCE
Status: DISCONTINUED | OUTPATIENT
Start: 2020-07-21 | End: 2020-07-21 | Stop reason: HOSPADM

## 2020-07-21 RX ORDER — PROPOFOL 10 MG/ML
INJECTION, EMULSION INTRAVENOUS AS NEEDED
Status: DISCONTINUED | OUTPATIENT
Start: 2020-07-21 | End: 2020-07-21 | Stop reason: SURG

## 2020-07-21 RX ORDER — ROCURONIUM BROMIDE 10 MG/ML
INJECTION, SOLUTION INTRAVENOUS AS NEEDED
Status: DISCONTINUED | OUTPATIENT
Start: 2020-07-21 | End: 2020-07-21 | Stop reason: SURG

## 2020-07-21 RX ORDER — HYDROCODONE BITARTRATE AND ACETAMINOPHEN 5; 325 MG/1; MG/1
2 TABLET ORAL EVERY 4 HOURS PRN
Status: DISCONTINUED | OUTPATIENT
Start: 2020-07-21 | End: 2020-07-21 | Stop reason: HOSPADM

## 2020-07-21 RX ORDER — ONDANSETRON 2 MG/ML
4 INJECTION INTRAMUSCULAR; INTRAVENOUS EVERY 6 HOURS PRN
Status: DISCONTINUED | OUTPATIENT
Start: 2020-07-21 | End: 2020-07-21 | Stop reason: HOSPADM

## 2020-07-21 RX ORDER — SODIUM CHLORIDE, SODIUM LACTATE, POTASSIUM CHLORIDE, CALCIUM CHLORIDE 600; 310; 30; 20 MG/100ML; MG/100ML; MG/100ML; MG/100ML
125 INJECTION, SOLUTION INTRAVENOUS CONTINUOUS
Status: CANCELLED | OUTPATIENT
Start: 2020-07-21

## 2020-07-21 RX ORDER — SODIUM CHLORIDE, SODIUM LACTATE, POTASSIUM CHLORIDE, CALCIUM CHLORIDE 600; 310; 30; 20 MG/100ML; MG/100ML; MG/100ML; MG/100ML
125 INJECTION, SOLUTION INTRAVENOUS CONTINUOUS
Status: DISCONTINUED | OUTPATIENT
Start: 2020-07-21 | End: 2020-07-21 | Stop reason: HOSPADM

## 2020-07-21 RX ORDER — PROMETHAZINE HYDROCHLORIDE 25 MG/ML
12.5 INJECTION, SOLUTION INTRAMUSCULAR; INTRAVENOUS ONCE
Status: COMPLETED | OUTPATIENT
Start: 2020-07-21 | End: 2020-07-21

## 2020-07-21 RX ADMIN — ONDANSETRON 4 MG: 2 INJECTION INTRAMUSCULAR; INTRAVENOUS at 11:17

## 2020-07-21 RX ADMIN — LIDOCAINE HYDROCHLORIDE 40 MG: 10 INJECTION, SOLUTION EPIDURAL; INFILTRATION; INTRACAUDAL; PERINEURAL at 09:16

## 2020-07-21 RX ADMIN — SODIUM CHLORIDE, SODIUM LACTATE, POTASSIUM CHLORIDE, AND CALCIUM CHLORIDE 75 ML/HR: .6; .31; .03; .02 INJECTION, SOLUTION INTRAVENOUS at 08:52

## 2020-07-21 RX ADMIN — SODIUM CHLORIDE, SODIUM LACTATE, POTASSIUM CHLORIDE, AND CALCIUM CHLORIDE: .6; .31; .03; .02 INJECTION, SOLUTION INTRAVENOUS at 10:20

## 2020-07-21 RX ADMIN — HYDROCODONE BITARTRATE AND ACETAMINOPHEN 1 TABLET: 5; 325 TABLET ORAL at 13:23

## 2020-07-21 RX ADMIN — SODIUM CHLORIDE, SODIUM LACTATE, POTASSIUM CHLORIDE, AND CALCIUM CHLORIDE: .6; .31; .03; .02 INJECTION, SOLUTION INTRAVENOUS at 08:45

## 2020-07-21 RX ADMIN — CEFAZOLIN SODIUM 2000 MG: 2 SOLUTION INTRAVENOUS at 09:15

## 2020-07-21 RX ADMIN — KETOROLAC TROMETHAMINE 30 MG: 30 INJECTION, SOLUTION INTRAMUSCULAR; INTRAVENOUS at 10:17

## 2020-07-21 RX ADMIN — FENTANYL CITRATE 100 MCG: 50 INJECTION, SOLUTION INTRAMUSCULAR; INTRAVENOUS at 09:13

## 2020-07-21 RX ADMIN — ONDANSETRON 4 MG: 2 INJECTION INTRAMUSCULAR; INTRAVENOUS at 09:09

## 2020-07-21 RX ADMIN — HYDROMORPHONE HYDROCHLORIDE 0.5 MG: 1 INJECTION, SOLUTION INTRAMUSCULAR; INTRAVENOUS; SUBCUTANEOUS at 10:30

## 2020-07-21 RX ADMIN — PROMETHAZINE HYDROCHLORIDE 12.5 MG: 25 INJECTION INTRAMUSCULAR; INTRAVENOUS at 12:00

## 2020-07-21 RX ADMIN — HYDROMORPHONE HYDROCHLORIDE 0.5 MG: 1 INJECTION, SOLUTION INTRAMUSCULAR; INTRAVENOUS; SUBCUTANEOUS at 10:26

## 2020-07-21 RX ADMIN — MIDAZOLAM 2 MG: 1 INJECTION INTRAMUSCULAR; INTRAVENOUS at 09:09

## 2020-07-21 RX ADMIN — DEXAMETHASONE SODIUM PHOSPHATE 4 MG: 10 INJECTION, SOLUTION INTRAMUSCULAR; INTRAVENOUS at 09:28

## 2020-07-21 RX ADMIN — PROPOFOL 180 MG: 10 INJECTION, EMULSION INTRAVENOUS at 09:16

## 2020-07-21 RX ADMIN — ROCURONIUM BROMIDE 40 MG: 10 INJECTION, SOLUTION INTRAVENOUS at 09:17

## 2020-07-21 NOTE — H&P (VIEW-ONLY)
Assessment/Plan:   Dominique Hayes is a 34 y  o female who is here for Hernia (N/P here for evaluation of an umbilical hernia  Has been there for about 4 years  Recently with more pain and some nausea )    Plan: Incisional and umbilical hernia - discussed operative approaches, risks, and benefits and patient has decided to proceed with surgery and I will schedule her for open incisional and umbilical hernia    Preoperative Clearance: None    HPI:  Dominique Hayes is a 34 y  o female who was referred for evaluation of Hernia (N/P here for evaluation of an umbilical hernia  Has been there for about 4 years  Recently with more pain and some nausea )    Currently noticed bulge above umbilicus and at umbilicus that has enlarged and causing more discomfort especially with activity  No changes in bowel habits occasional nausea  ROS:  General ROS: negative  negative for - chills, fatigue, fever or night sweats, weight loss  Respiratory ROS: no cough, shortness of breath, or wheezing  Cardiovascular ROS: no chest pain or dyspnea on exertion  Genito-Urinary ROS: no dysuria, trouble voiding, or hematuria  Musculoskeletal ROS: negative for - gait disturbance, joint pain or muscle pain  Neurological ROS: no TIA or stroke symptoms  GI: nausea and abdominal pain    [unfilled]  Diphenhydramine  No current facility-administered medications for this visit  No current outpatient medications on file      Facility-Administered Medications Ordered in Other Visits:     lactated ringers infusion, 75 mL/hr, Intravenous, Continuous, Clair Fuentes MD  Past Medical History:   Diagnosis Date    Chest pain     resolved 12/5/16    Congenital heart disease     Exudative tonsillitis     last assessed 9/20/16  documented resolved 12/5/16    Mitral valve prolapse     last assessed 3/26/15    Mitral valve regurgitation      Past Surgical History:   Procedure Laterality Date    ATRIOVENTRICULAR CANAL REPAIR      resolved    CARDIAC SURGERY      WISDOM TOOTH EXTRACTION       Family History   Problem Relation Age of Onset    Throat cancer Mother     Alcohol abuse Mother     Hypertension Father     Hyperlipidemia Father     Heart murmur Father     Hypertension Maternal Grandfather     Diabetes Maternal Grandfather     Mental illness Family     Substance Abuse Neg Hx         family history      reports that she has never smoked  She has never used smokeless tobacco  She reports that she drinks alcohol  She reports that she does not use drugs  Labs:   Lab Results   Component Value Date    WBC 6 68 01/04/2020    WBC 6 13 10/14/2016    HGB 14 5 01/04/2020    HGB 13 5 10/14/2016     01/04/2020     10/14/2016     No results found for: ALT, AST  This SmartLink has not been configured with any valid records  PHYSICAL EXAM  General Appearance:    Alert, cooperative, no distress   Head:    Normocephalic without obvious abnormality   Eyes:    PERRL, conjunctiva/corneas clear, EOM's intact        Neck:   Supple, no adenopathy, no JVD   Back:     Symmetric, no spinal or CVA tenderness   Lungs:     Clear to auscultation bilaterally, no wheezing or rhonchi   Heart:    Regular rate and rhythm, S1 and S2 normal, no murmur   Abdomen:     Soft +BS ND NT reducible incisional and umbilical hernia    Extremities:   Extremities normal  No clubbing, cyanosis or edema   Psych:   Normal Affect, AOx3  Neurologic:  Skin:   CNII-XII intact  Strength symmetric, speech intact    Warm, dry, intact, no visible rashes or lesions         Physical Exam              Some portions of this record may have been generated with voice recognition software  There may be translation, syntax,  or grammatical errors  Occasional wrong word or "sound-a-like" substitutions may have occurred due to the inherent limitations of the voice recognition software  Read the chart carefully and recognize, using context, where substitutions may have occurred   If you have any questions, please contact the dictating provider for clarification or correction, as needed  This encounter has been coded by a non-certified coder

## 2020-07-21 NOTE — ANESTHESIA POSTPROCEDURE EVALUATION
Post-Op Assessment Note    CV Status:  Stable  Pain Score: 3    Pain management: adequate  Multimodal analgesia used between 6 hours prior to anesthesia start to PACU discharge    Mental Status:  Awake and sleepy   Hydration Status:  Stable   PONV Controlled:  None   Airway Patency:  Patent  Airway: intubated   Post Op Vitals Reviewed: Yes      Staff: CRNA           BP      Temp      Pulse     Resp      SpO2

## 2020-07-21 NOTE — INTERVAL H&P NOTE
H&P reviewed  After examining the patient I find no changes in the patients condition since the H&P had been written      Vitals:    07/21/20 0810   BP: 123/74   Pulse: 75   Resp: 18   Temp: 99 3 °F (37 4 °C)   SpO2: 98%

## 2020-07-21 NOTE — PROGRESS NOTES
Assessment/Plan:   Gaile Runner is a 34 y  o female who is here for Hernia (N/P here for evaluation of an umbilical hernia  Has been there for about 4 years  Recently with more pain and some nausea )    Plan: Incisional and umbilical hernia - discussed operative approaches, risks, and benefits and patient has decided to proceed with surgery and I will schedule her for open incisional and umbilical hernia    Preoperative Clearance: None    HPI:  Gaile Runner is a 34 y  o female who was referred for evaluation of Hernia (N/P here for evaluation of an umbilical hernia  Has been there for about 4 years  Recently with more pain and some nausea )    Currently noticed bulge above umbilicus and at umbilicus that has enlarged and causing more discomfort especially with activity  No changes in bowel habits occasional nausea  ROS:  General ROS: negative  negative for - chills, fatigue, fever or night sweats, weight loss  Respiratory ROS: no cough, shortness of breath, or wheezing  Cardiovascular ROS: no chest pain or dyspnea on exertion  Genito-Urinary ROS: no dysuria, trouble voiding, or hematuria  Musculoskeletal ROS: negative for - gait disturbance, joint pain or muscle pain  Neurological ROS: no TIA or stroke symptoms  GI: nausea and abdominal pain    [unfilled]  Diphenhydramine  No current facility-administered medications for this visit  No current outpatient medications on file      Facility-Administered Medications Ordered in Other Visits:     lactated ringers infusion, 75 mL/hr, Intravenous, Continuous, Carlo Bernard MD  Past Medical History:   Diagnosis Date    Chest pain     resolved 12/5/16    Congenital heart disease     Exudative tonsillitis     last assessed 9/20/16  documented resolved 12/5/16    Mitral valve prolapse     last assessed 3/26/15    Mitral valve regurgitation      Past Surgical History:   Procedure Laterality Date    ATRIOVENTRICULAR CANAL REPAIR      resolved    CARDIAC SURGERY      WISDOM TOOTH EXTRACTION       Family History   Problem Relation Age of Onset    Throat cancer Mother     Alcohol abuse Mother     Hypertension Father     Hyperlipidemia Father     Heart murmur Father     Hypertension Maternal Grandfather     Diabetes Maternal Grandfather     Mental illness Family     Substance Abuse Neg Hx         family history      reports that she has never smoked  She has never used smokeless tobacco  She reports that she drinks alcohol  She reports that she does not use drugs  Labs:   Lab Results   Component Value Date    WBC 6 68 01/04/2020    WBC 6 13 10/14/2016    HGB 14 5 01/04/2020    HGB 13 5 10/14/2016     01/04/2020     10/14/2016     No results found for: ALT, AST  This SmartLink has not been configured with any valid records  PHYSICAL EXAM  General Appearance:    Alert, cooperative, no distress   Head:    Normocephalic without obvious abnormality   Eyes:    PERRL, conjunctiva/corneas clear, EOM's intact        Neck:   Supple, no adenopathy, no JVD   Back:     Symmetric, no spinal or CVA tenderness   Lungs:     Clear to auscultation bilaterally, no wheezing or rhonchi   Heart:    Regular rate and rhythm, S1 and S2 normal, no murmur   Abdomen:     Soft +BS ND NT reducible incisional and umbilical hernia    Extremities:   Extremities normal  No clubbing, cyanosis or edema   Psych:   Normal Affect, AOx3  Neurologic:  Skin:   CNII-XII intact  Strength symmetric, speech intact    Warm, dry, intact, no visible rashes or lesions         Physical Exam              Some portions of this record may have been generated with voice recognition software  There may be translation, syntax,  or grammatical errors  Occasional wrong word or "sound-a-like" substitutions may have occurred due to the inherent limitations of the voice recognition software  Read the chart carefully and recognize, using context, where substitutions may have occurred   If you have any questions, please contact the dictating provider for clarification or correction, as needed  This encounter has been coded by a non-certified coder

## 2020-07-21 NOTE — ANESTHESIA PREPROCEDURE EVALUATION
Review of Systems/Medical History  Patient summary reviewed  Chart reviewed      Cardiovascular  Valvular heart disease , mitral valve prolapse,   Comment: Hx of congenital heart defect repaired when she was an infant, no current CP or KILPATRICK/SOB,  Pulmonary  Negative pulmonary ROS        GI/Hepatic  Negative GI/hepatic ROS          Negative  ROS        Endo/Other  Negative endo/other ROS      GYN  Negative gynecology ROS          Hematology  Negative hematology ROS      Musculoskeletal  Negative musculoskeletal ROS        Neurology  Negative neurology ROS      Psychology   Anxiety,              Physical Exam    Airway    Mallampati score: II  TM Distance: >3 FB  Neck ROM: full     Dental   No notable dental hx     Cardiovascular  Rhythm: regular, Rate: normal, Murmur, Cardiovascular exam normal    Pulmonary  Pulmonary exam normal Breath sounds clear to auscultation,     Other Findings        Anesthesia Plan  ASA Score- 2     Anesthesia Type- general with ASA Monitors  Additional Monitors:   Airway Plan: ETT  Comment: Benefits and risks of planned anesthetic discussed with patient, and agrees to proceed  I, Dr Gold Smith, the attending anesthesiologist, have personally seen and evaluated the patient prior to anesthetic care  I have reviewed the preanesthetic record, and other medical records if appropriate to the anesthetic care  If a CRNA is involved in the case, I have reviewed the CRNA assessment, if present, and agree  The patient is in a suitable condition to proceed with my formulated anesthetic plan        Plan Factors-    Induction- intravenous  Postoperative Plan- Plan for postoperative opioid use  Informed Consent- Anesthetic plan and risks discussed with patient

## 2020-07-21 NOTE — INTERIM OP NOTE
REPAIR HERNIA VENTRAL, REPAIR HERNIA UMBILICAL  Postoperative Note  PATIENT NAME: Nataly Hercules  : 1990  MRN: [id number]  [facility] OR ROOM 02    Surgery Date: 2020    Preop Diagnosis:  Ventral hernia without obstruction or gangrene [K43 9]    Post-Op Diagnosis Codes: * Ventral hernia without obstruction or gangrene [B47 9]  Umbilical hernia  Procedure(s) (LRB):  REPAIR HERNIA VENTRAL (N/A)  REPAIR HERNIA UMBILICAL (N/A)    Surgeon(s) and Role:     * Do Caldwell MD - Primary     * Nazanin Almanza PA-C - Assisting    Specimens:  * No specimens in log *    Estimated Blood Loss:   10 mL    Anesthesia Type:   General ETA    Findings:   As above  Complications:   None    Hernia Surgery Operative Note    Name: Nataly Hercules    Gender: female    Age: 34 y o  Race:     BMI: Body mass index is 26 3 kg/m²  DIAGNOSIS:   1  S/P hernia repair  HYDROcodone-acetaminophen (NORCO) 5-325 mg per tablet    ibuprofen (MOTRIN) 600 mg tablet   2   Ventral hernia without obstruction or gangrene  ceFAZolin (ANCEF) IVPB (premix) 2,000 mg 50 mL    lactated ringers infusion    Reason for no Mechanical VTE Prophylaxis    Reason for no Mechanical VTE Prophylaxis       Diabetes Mellitis: No    Coronary Heart Disease:  Congenital heart disease, MVP    Cancer: No    Steroid Use: No    Tobacco use: No   Last used: never smoked     Alcohol use: Yes Minimal     Location of Hernia: ventral/ incisional  Length:3 0 cm  Width:2 0 cm    Location of Hernia: umbilical  HLSUP 5 cm  Width:1 0 cm  Primary: Yes  Recurrent: No   Number of recurrences:    Access: Laparoscope    Component Separation: No    Mesh:   Yes -  Type: Synthetic   Ventrio Hernia Patch 8 x 12 cm    Operative Time: 60 min           SIGNATURE: Davian Almanza PA-C   DATE: 2020   TIME: 10:44 AM

## 2020-07-21 NOTE — OP NOTE
Ventral Herniorrhaphy Procedure Note    Name: Talha Cevallos   : 1990  MRN: [id number]  Date: 2020      Indications: Symptomatic ventral incisional and umbilical hernia    Pre-operative Diagnosis: Symptomatic ventral incisional and umbilical hernia  Post-operative Diagnosis: Symptomatic ventral incisional and umbilicalhernia    Procedure: Open incisional and umbilical hernia repair with mesh    Surgeon: Bell Kelly MD    Assistants: Syed ESCOBEDO, no qualified resident available  PA was medically necessary for the surgical safety of the case, including suturing, retraction, hemostasis  Anesthesia: General endotracheal anesthesia    ASA Class: 2    Procedure Details   The patient was seen in the Holding Room  The risks, benefits, complications, treatment options, and expected outcomes were discussed with the patient  The possibilities of reaction to medication, pulmonary aspiration, perforation of viscus, bleeding, recurrent infection, the need for additional procedures,The risk of bowel injury, failure to diagnose a condition, and creating a complication requiring transfusion or operation were discussed with the patient  The patient concurred with the proposed plan, giving informed consent  The site of surgery properly noted/marked  The patient was taken to Operating Room and identified as Talha Cevallos  Staff verified patient name, , and procedure  A Time Out was held and the above information confirmed  The patient was placed supine  After establishing general anesthesia, the abdomen was prepped and draped in standard fashion  Local anesthesia was used at the incision  Vertical incision  Dissection was carried down to the hernia sac located above the fascia and mobilized from surrounding structures  Intact fascia was identified circumferentially around each defect  The hernia sacs were isolated out down to the level of the fascial defect    The dissection was carried in the preperitoneal space above, below and laterally from each defect creating a preperitoneal space to accommodate the mesh  Flaps anterior to the fascia were also created in order to place the suture  The mesh was selected to fit both defects with at least 3-5 cm of overlap  Prolene sutures were used and placed under direct vision in a circumferential fashion, to both center the mesh and  securing the mesh without any gaps between the sutures  Once all the sutures were placed, they were then all tied down and secured  The midline fascia was closed for both defects using interrupted figure of eight prolene sutures for both defects  This allowed for a tension-free repair  The wound was irrigated  The subcutaneous layers were closed, attaching the subcutaneous tissues to the fascia, using interrupted 2-0 Vicryl suture, and then 3-0 Vicryl suture  The skin was closed using 4-0 Monocryl sutures  Drain was not placed in the subcutaneous tissues and brought out through separate stab incision, and then secured using a nylon suture  The wound was dressed and the patient tolerated the procedure well  An abdominal binder was placed at the conclusion of the procedure  Instrument, sponge, and needle counts were correct prior to closure and at the conclusion of the case  This text is generated with voice recognition software  There may be translation, syntax,  or grammatical errors  If you have any questions, please contact the dictating provider  Findings:  Incisional and umbilical hernia     Estimated Blood Loss:  Minimal                    Specimens: Sac was sent to pathology if excised  Order Name Source Comment Collection Info Order Time   NOVEL CORONAVIRUS (COVID-19), PCR [facility] Nose Pre-procedure-[facility] result not available Collected By: Damian Berger RN 7/21/2020  7:58 AM              Implants: Mesh            Complications:  None; patient tolerated the procedure well  Disposition: PACU            Condition: stable    Attending Attestation: I was present for the entire procedure       Signature:   Krissy Carter MD  Date: 7/21/2020 Time: 10:27 AM

## 2020-07-21 NOTE — DISCHARGE INSTRUCTIONS
Kristi Guerrero Instructions                  Dr Duncan THOMAS     1  General: Rogerio Cosby will feel pulling sensations around the wound or funny aches and pains around the incisions  This is normal  Even minor surgery is a change in your body and this is your bodys way of reaction to it  If you have had abdominal surgery, it may help to support the incision with a small pillow or blanket for comfort when moving or coughing  2  Wound care:     Glue - Leave glue alone, it will fall off on its own, no need for an additional dressings    3  Water: You may shower over the wounds  Do not bathe or use a pool or hot tub until cleared by the physician  You may shower right over the incisions and rinse wound with soapy water but do not scrub incisions  Pat dry when you are done  4  Activity: You may go up and down stairs, walk as much as you are comfortable, but walk at least 3 times each day  If you have had abdominal surgery, do not lift anything heavier than 15 pounds for at least 2 weeks, unless cleared by the doctor  5  Diet: You may resume a regular diet  If you had a same-day surgery or overnight stay surgery, you may wish to eat lightly for a few days: soups, crackers, and sandwiches  You may resume a regular diet when ready  6  Medications: Resume all of your previous medications, unless told otherwise by the doctor  Avoid aspirin products for 2-3 days after the date of surgery  You may, at that time, began to take them again  Tylenol and Ibuprofen are always fine, unless you are taking any narcotic pain medication containing Tylenol (such as Percocet, Darvocet, Vicodin, or anything containing acetaminophen)  Do not take Tylenol if you're taking these medications  You do not need to take the narcotic pain medications unless you are having significant pain and discomfort  7  Driving: You will need someone to drive you home on the day of surgery   Do not drive or make any important decisions while on narcotic pain medication or 24 hours and after anesthesia or sedation for surgery  Generally, you may drive when your off all narcotic pain medications  8  Upset Stomach: You may take Maalox, Tums, or similar items for an upset stomach  If your narcotic pain medication causes an upset stomach, do not take it on an empty stomach  Try taking it with at least some crackers or toast      9  Constipation: Patients often experienced constipation after surgery  You may take over-the-counter medication for this, such as Metamucil, Senokot, Dulcolax, milk of magnesia, etc  You may take a suppository unless you have had anorectal surgery such as a procedure on your hemorrhoids  If you experience significant nausea or vomiting after abdominal surgery, call the office before trying any of these medications  10  Call the office: If you are experiencing any of the following, fevers above 101 5°, significant nausea or vomiting, if the wound develops drainage and/or is excessive redness around the wound, or if you have significant diarrhea or other worsening symptoms  11  Pain: You may be given a prescription for pain  This will be given to the hospital, the day of surgery  12  Sexual Activity: You may resume sexual activity when you feel ready and comfortable and your incision is sealed and healed without apparent infection risk  13  Urination: If you haven't urinated in 6 hours, go directly to the ER for evaluation for urinary retention  14  Follow-up in 2 weeks          [facility]  Phone: [phone]

## 2020-07-22 ENCOUNTER — TELEPHONE (OUTPATIENT)
Dept: SURGERY | Facility: CLINIC | Age: 30
End: 2020-07-22

## 2020-07-22 LAB
INPATIENT: NORMAL
SARS-COV-2 RNA SPEC QL NAA+PROBE: NOT DETECTED

## 2020-07-22 NOTE — TELEPHONE ENCOUNTER
Phone call from patient - just to make Dr Arzella Fabry aware she feels a "lump" under her umbilicus that wasn't there before  She will call back if the lump increases in size  None painful

## 2020-08-06 ENCOUNTER — OFFICE VISIT (OUTPATIENT)
Dept: SURGERY | Facility: HOSPITAL | Age: 30
End: 2020-08-06

## 2020-08-06 VITALS — TEMPERATURE: 98.5 F | HEIGHT: 62 IN | WEIGHT: 146 LBS | BODY MASS INDEX: 26.87 KG/M2

## 2020-08-06 DIAGNOSIS — Z09 POSTOP CHECK: Primary | ICD-10-CM

## 2020-08-06 PROCEDURE — 99024 POSTOP FOLLOW-UP VISIT: CPT | Performed by: SURGERY

## 2020-08-06 NOTE — PROGRESS NOTES
Seen and examined doing well no issues  AVSS afebrile  Soft +BS ND NT incision cdi no recurrent hernia    S/p ventral and umbilical hernia repair    Cont light duty for two weeks, f/u prn

## 2020-08-13 ENCOUNTER — OFFICE VISIT (OUTPATIENT)
Dept: SURGERY | Facility: HOSPITAL | Age: 30
End: 2020-08-13

## 2020-08-13 VITALS — WEIGHT: 148 LBS | HEIGHT: 62 IN | TEMPERATURE: 98.6 F | BODY MASS INDEX: 27.23 KG/M2

## 2020-08-13 DIAGNOSIS — Z98.890 POST-OPERATIVE STATE: Primary | ICD-10-CM

## 2020-08-13 PROCEDURE — 3008F BODY MASS INDEX DOCD: CPT | Performed by: SURGERY

## 2020-08-13 PROCEDURE — 99024 POSTOP FOLLOW-UP VISIT: CPT | Performed by: SURGERY

## 2020-08-13 NOTE — PROGRESS NOTES
Assessment/Plan:   Marisela Rene is a 34 y  o female who comes in today for repeat postop evaluation with complaints of redness and drainage from proximal incision site  Patient is status post open repair of umbilical and ventral hernia with mesh on 07/21/2020  Patient saw Dr Mandy Keen last week and was doing well  She developed some drainage from her proximal incision site approximately 3 days ago  This site is minimally tender  Denies any swelling, purulent drainage, fevers or chills    On exam the patient's incision site is intact  The proximal wound has loosely approximated surgical glue in place with minor skin irritation  The surgical glue was removed in the office today  There are 2 small approximate 1/2 centimeter superficial skin edge openings with minimal erythema  There is a minimal amount of serous fluid present at the sites  There is no purulent drainage  There is no surrounding induration, underlying fluctuance, increased skin warmth, or other signs to indicate underlying infection  At this point I have instructed the patient to cover the open areas with silver gel ointment and dry sterile gauze  This should be replaced daily  She should continue to monitor the wound and notify us of any changes  She should call immediately if she notices increasing erythema, swelling, purulent drainage, or develops fevers or chills  HPI:  Marisela Rene is a 34 y  o female who comes in today for postoperative check after recent ventral and umbilical hernia repairs with mesh  Patient has been doing well until approximately 3 days ago when she noted some drainage from her proximal wound incision  This has continued with minimal drainage and patient thought that the drainage had small amount of pus on exam today  She is concerned about underlying infection  She denies any nausea or vomiting    No fevers or chills packing      ROS:  General ROS: negative for - chills, fatigue, fever or night sweats, weight loss  Respiratory ROS: no cough, shortness of breath, or wheezing  Cardiovascular ROS: no chest pain or dyspnea on exertion  Abdomen ROS: no pain, N/V  Genito-Urinary ROS: no dysuria, trouble voiding, or hematuria  Musculoskeletal ROS: negative for - gait disturbance, joint pain or muscle pain  Neurological ROS: no TIA or stroke symptoms  Skin ROS: as per HPI    ALLERGIES  Diphenhydramine    Current Outpatient Medications:     escitalopram (LEXAPRO) 10 mg tablet, Take 2 tablets (20 mg total) by mouth daily, Disp: 90 tablet, Rfl: 3    etonogestrel (NEXPLANON) subdermal implant, Inject 1 each under the skin, Disp: , Rfl:     ibuprofen (MOTRIN) 600 mg tablet, TAKE 1 TABLET BY MOUTH EVERY 6 HOURS AS NEEDED FOR MILD PAIN, Disp: 90 tablet, Rfl: 1    ibuprofen (MOTRIN) 600 mg tablet, Take 1 tablet (600 mg total) by mouth every 6 (six) hours as needed for mild pain or moderate pain, Disp: 20 tablet, Rfl: 0  Past Medical History:   Diagnosis Date    Chest pain     resolved 12/5/16    Congenital heart disease     Exudative tonsillitis     last assessed 9/20/16  documented resolved 12/5/16    Mitral valve prolapse     last assessed 3/26/15    Mitral valve regurgitation      Past Surgical History:   Procedure Laterality Date    ATRIOVENTRICULAR CANAL REPAIR      resolved    CARDIAC SURGERY      MS REPAIR INCISIONAL HERNIA,REDUCIBLE N/A 7/21/2020    Procedure: REPAIR HERNIA VENTRAL;  Surgeon: Rip Antonio MD;  Location:  MAIN OR;  Service: General    MS REPAIR UMBILICAL VKVY,1+W/B,KVMYL N/A 7/21/2020    Procedure: REPAIR HERNIA UMBILICAL;  Surgeon: Rip Antonio MD;  Location:  MAIN OR;  Service: General    WISDOM TOOTH EXTRACTION       Family History   Problem Relation Age of Onset    Throat cancer Mother     Alcohol abuse Mother     Hypertension Father     Hyperlipidemia Father     Heart murmur Father     Hypertension Maternal Grandfather     Diabetes Maternal Grandfather     Mental illness Family     Substance Abuse Neg Hx         family history      reports that she has never smoked  She has never used smokeless tobacco  She reports current alcohol use  She reports that she does not use drugs  PHYSICAL EXAM  Vitals:    08/13/20 1414   Temp: 98 6 °F (37 °C)       General: normal, cooperative, no distress  Abdominal: soft, nondistended, non-tender, normal bowel sounds  Incision:   Incision site is intact other than to approximate 0 5 centimeter superficial areas at proximal wound with mild erythema likely related to irritation from overlying skin glue  Patient with minimal serous drainage  No further drainage able to be expressed  Wound does not probe into underlying tissue  No surrounding induration, underlying fluctuance, increased skin warmth, purulence, other signs of underlying infection        Mariaelena Tinsley PA-C

## 2020-08-13 NOTE — PATIENT INSTRUCTIONS
Patient's wound with to very small superficial wound opening with serous drainage  No signs of underlying infection  Treat with silver gel ointment and cover with dry gauze until wound is fully closed  Call for re-evaluation for any changes  Call with any questions or concerns

## 2020-09-08 DIAGNOSIS — F41.9 ANXIETY: ICD-10-CM

## 2020-09-08 RX ORDER — ESCITALOPRAM OXALATE 10 MG/1
TABLET ORAL
Qty: 60 TABLET | Refills: 5 | Status: SHIPPED | OUTPATIENT
Start: 2020-09-08 | End: 2021-05-10

## 2020-09-17 DIAGNOSIS — Z98.890 S/P HERNIA REPAIR: ICD-10-CM

## 2020-09-17 DIAGNOSIS — Z87.19 S/P HERNIA REPAIR: ICD-10-CM

## 2020-09-19 RX ORDER — IBUPROFEN 600 MG/1
600 TABLET ORAL EVERY 6 HOURS PRN
Qty: 20 TABLET | Refills: 0 | Status: SHIPPED | OUTPATIENT
Start: 2020-09-19

## 2020-09-23 DIAGNOSIS — Z20.822 SUSPECTED COVID-19 VIRUS INFECTION: ICD-10-CM

## 2020-09-23 PROCEDURE — U0003 INFECTIOUS AGENT DETECTION BY NUCLEIC ACID (DNA OR RNA); SEVERE ACUTE RESPIRATORY SYNDROME CORONAVIRUS 2 (SARS-COV-2) (CORONAVIRUS DISEASE [COVID-19]), AMPLIFIED PROBE TECHNIQUE, MAKING USE OF HIGH THROUGHPUT TECHNOLOGIES AS DESCRIBED BY CMS-2020-01-R: HCPCS | Performed by: FAMILY MEDICINE

## 2020-09-24 ENCOUNTER — TELEPHONE (OUTPATIENT)
Dept: FAMILY MEDICINE CLINIC | Facility: CLINIC | Age: 30
End: 2020-09-24

## 2020-09-24 LAB — SARS-COV-2 RNA SPEC QL NAA+PROBE: NOT DETECTED

## 2020-12-11 ENCOUNTER — TELEMEDICINE (OUTPATIENT)
Dept: FAMILY MEDICINE CLINIC | Facility: CLINIC | Age: 30
End: 2020-12-11
Payer: COMMERCIAL

## 2020-12-11 VITALS — BODY MASS INDEX: 27.23 KG/M2 | TEMPERATURE: 98.1 F | HEIGHT: 62 IN | WEIGHT: 148 LBS

## 2020-12-11 DIAGNOSIS — B34.9 VIRAL INFECTION, UNSPECIFIED: ICD-10-CM

## 2020-12-11 DIAGNOSIS — Z20.822 EXPOSURE TO COVID-19 VIRUS: ICD-10-CM

## 2020-12-11 PROCEDURE — 99213 OFFICE O/P EST LOW 20 MIN: CPT | Performed by: NURSE PRACTITIONER

## 2020-12-11 PROCEDURE — U0003 INFECTIOUS AGENT DETECTION BY NUCLEIC ACID (DNA OR RNA); SEVERE ACUTE RESPIRATORY SYNDROME CORONAVIRUS 2 (SARS-COV-2) (CORONAVIRUS DISEASE [COVID-19]), AMPLIFIED PROBE TECHNIQUE, MAKING USE OF HIGH THROUGHPUT TECHNOLOGIES AS DESCRIBED BY CMS-2020-01-R: HCPCS | Performed by: NURSE PRACTITIONER

## 2020-12-11 PROCEDURE — 1036F TOBACCO NON-USER: CPT | Performed by: NURSE PRACTITIONER

## 2020-12-11 PROCEDURE — 3725F SCREEN DEPRESSION PERFORMED: CPT | Performed by: NURSE PRACTITIONER

## 2020-12-11 PROCEDURE — 3008F BODY MASS INDEX DOCD: CPT | Performed by: NURSE PRACTITIONER

## 2020-12-12 LAB — SARS-COV-2 RNA SPEC QL NAA+PROBE: NOT DETECTED

## 2020-12-14 ENCOUNTER — TELEPHONE (OUTPATIENT)
Dept: FAMILY MEDICINE CLINIC | Facility: CLINIC | Age: 30
End: 2020-12-14

## 2021-02-02 ENCOUNTER — TELEMEDICINE (OUTPATIENT)
Dept: FAMILY MEDICINE CLINIC | Facility: CLINIC | Age: 31
End: 2021-02-02
Payer: COMMERCIAL

## 2021-02-02 VITALS — BODY MASS INDEX: 27.23 KG/M2 | HEIGHT: 62 IN | WEIGHT: 148 LBS

## 2021-02-02 DIAGNOSIS — G89.29 CHRONIC NONINTRACTABLE HEADACHE, UNSPECIFIED HEADACHE TYPE: ICD-10-CM

## 2021-02-02 DIAGNOSIS — F41.9 ANXIETY: Primary | ICD-10-CM

## 2021-02-02 DIAGNOSIS — R51.9 CHRONIC NONINTRACTABLE HEADACHE, UNSPECIFIED HEADACHE TYPE: ICD-10-CM

## 2021-02-02 PROCEDURE — 99213 OFFICE O/P EST LOW 20 MIN: CPT | Performed by: FAMILY MEDICINE

## 2021-02-02 PROCEDURE — 3008F BODY MASS INDEX DOCD: CPT | Performed by: FAMILY MEDICINE

## 2021-02-02 RX ORDER — IBUPROFEN 600 MG/1
600 TABLET ORAL EVERY 8 HOURS PRN
Qty: 90 TABLET | Refills: 5 | Status: SHIPPED | OUTPATIENT
Start: 2021-02-02 | End: 2021-08-31

## 2021-02-02 NOTE — PROGRESS NOTES
Virtual Regular Visit      Assessment/Plan:    Problem List Items Addressed This Visit        Other    Chronic headache    Relevant Medications    ibuprofen (MOTRIN) 600 mg tablet      Other Visit Diagnoses     Anxiety    -  Primary               Reason for visit is   Chief Complaint   Patient presents with    Follow-up     DISCUSS ANXIETY MEDS        Encounter provider Baldemar Brewer MD    Provider located at [address] [place] [id number]      Recent Visits  No visits were found meeting these conditions  Showing recent visits within past 7 days and meeting all other requirements     Today's Visits  Date Type Provider Dept   02/02/21 Telemedicine Baldemar Brewer MD [address] today's visits and meeting all other requirements     Future Appointments  No visits were found meeting these conditions  Showing future appointments within next 150 days and meeting all other requirements        The patient was identified by name and date of birth  Isidro Vanessa was informed that this is a telemedicine visit and that the visit is being conducted through [address] [place] [address] and patient was informed that this is not a secure, HIPAA-compliant platform  She agrees to proceed     My office door was closed  No one else was in the room  She acknowledged consent and understanding of privacy and security of the video platform  The patient has agreed to participate and understands they can discontinue the visit at any time  Patient is aware this is a billable service  Subjective  Isidro Vanessa is a 27 y o  female on lexapro for anxiety--decr effectiveness---wt gain         HPI     Past Medical History:   Diagnosis Date    Chest pain     resolved 12/5/16    Congenital heart disease     Exudative tonsillitis     last assessed 9/20/16  documented resolved 12/5/16    Mitral valve prolapse     last assessed 3/26/15    Mitral valve regurgitation        Past Surgical History:   Procedure Laterality Date    ATRIOVENTRICULAR CANAL REPAIR      resolved    CARDIAC SURGERY      NE REPAIR INCISIONAL HERNIA,REDUCIBLE N/A 7/21/2020    Procedure: REPAIR HERNIA VENTRAL;  Surgeon: Salley Schaumann, MD;  Location: [facility] OR;  Service: General    NE REPAIR UMBILICAL YPYA,5+E/T,NUNTD N/A 7/21/2020    Procedure: REPAIR HERNIA UMBILICAL;  Surgeon: Salley Schaumann, MD;  Location:  MAIN OR;  Service: General    WISDOM TOOTH EXTRACTION         Current Outpatient Medications   Medication Sig Dispense Refill    escitalopram (LEXAPRO) 10 mg tablet TAKE 2 TABLETS BY MOUTH EVERY DAY 60 tablet 5    etonogestrel (NEXPLANON) subdermal implant Inject 1 each under the skin      ibuprofen (MOTRIN) 600 mg tablet Take 1 tablet (600 mg total) by mouth every 6 (six) hours as needed for mild pain or moderate pain 20 tablet 0    ibuprofen (MOTRIN) 600 mg tablet Take 1 tablet (600 mg total) by mouth every 8 (eight) hours as needed for mild pain 90 tablet 5     No current facility-administered medications for this visit  Allergies   Allergen Reactions    Diphenhydramine Shortness Of Breath, Swelling and Rash     Reaction Date:Approx 2007  Pt reports she has had Benadryl since without reaction       Review of Systems   Constitutional: Negative for fatigue, fever and unexpected weight change  HENT: Negative for congestion, sinus pain and sore throat  Eyes: Negative for visual disturbance  Respiratory: Negative for shortness of breath and wheezing  Cardiovascular: Negative for chest pain and palpitations  Gastrointestinal: Negative for abdominal pain, nausea and vomiting  Musculoskeletal: Negative  Negative for arthralgias and myalgias  Neurological: Negative for syncope, weakness and numbness  Psychiatric/Behavioral: Negative for confusion, dysphoric mood and suicidal ideas  The patient is nervous/anxious          Video Exam    Vitals: 02/02/21 0725   Weight: 67 1 kg (148 lb)   Height: 5' 2" (1 575 m)       Physical Exam  Pulmonary:      Effort: Pulmonary effort is normal           I spent 15 minutes directly with the patient during this visit      VIRTUAL VISIT Rosario Weiner 108 acknowledges that she has consented to an online visit or consultation  She understands that the online visit is based solely on information provided by her, and that, in the absence of a face-to-face physical evaluation by the physician, the diagnosis she receives is both limited and provisional in terms of accuracy and completeness  This is not intended to replace a full medical face-to-face evaluation by the physician  Jazlyn Hope understands and accepts these terms

## 2021-02-03 ENCOUNTER — TELEMEDICINE (OUTPATIENT)
Dept: FAMILY MEDICINE CLINIC | Facility: CLINIC | Age: 31
End: 2021-02-03
Payer: COMMERCIAL

## 2021-02-03 DIAGNOSIS — B34.9 VIRAL INFECTION, UNSPECIFIED: ICD-10-CM

## 2021-02-03 DIAGNOSIS — B34.9 VIRAL INFECTION, UNSPECIFIED: Primary | ICD-10-CM

## 2021-02-03 DIAGNOSIS — J02.9 SORE THROAT: ICD-10-CM

## 2021-02-03 LAB — S PYO AG THROAT QL: NEGATIVE

## 2021-02-03 PROCEDURE — 87880 STREP A ASSAY W/OPTIC: CPT | Performed by: FAMILY MEDICINE

## 2021-02-03 PROCEDURE — U0003 INFECTIOUS AGENT DETECTION BY NUCLEIC ACID (DNA OR RNA); SEVERE ACUTE RESPIRATORY SYNDROME CORONAVIRUS 2 (SARS-COV-2) (CORONAVIRUS DISEASE [COVID-19]), AMPLIFIED PROBE TECHNIQUE, MAKING USE OF HIGH THROUGHPUT TECHNOLOGIES AS DESCRIBED BY CMS-2020-01-R: HCPCS | Performed by: FAMILY MEDICINE

## 2021-02-03 PROCEDURE — 99213 OFFICE O/P EST LOW 20 MIN: CPT | Performed by: FAMILY MEDICINE

## 2021-02-03 PROCEDURE — 1036F TOBACCO NON-USER: CPT | Performed by: FAMILY MEDICINE

## 2021-02-03 PROCEDURE — U0005 INFEC AGEN DETEC AMPLI PROBE: HCPCS | Performed by: FAMILY MEDICINE

## 2021-02-03 NOTE — PROGRESS NOTES
Virtual Regular Visit      Assessment/Plan:    Problem List Items Addressed This Visit     None      Visit Diagnoses     Viral infection, unspecified    -  Primary    Relevant Orders    Novel Coronavirus (Covid-19),PCR [facility] - Collected at [facility]    Sore throat        Relevant Orders    POCT rapid strepA (Completed)               Reason for visit is   Chief Complaint   Patient presents with    Virtual Regular Visit        Encounter provider Nicol Peterson MD    Provider located at [address] [place] [phone]      Recent Visits  Date Type Provider Dept   02/02/21 Telemedicine Willetta Spatz, MD [facility] [address] [facility] recent visits within past 7 days and meeting all other requirements     Today's Visits  Date Type Provider Dept   02/03/21 Telemedicine Nicol Peterson MD [facility] [address] [facility] today's visits and meeting all other requirements     Future Appointments  No visits were found meeting these conditions  Showing future appointments within next 150 days and meeting all other requirements        The patient was identified by name and date of birth  Roselyn Boo was informed that this is a telemedicine visit and that the visit is being conducted through [address] [place] [address] and patient was informed that this is not a secure, HIPAA-compliant platform  She agrees to proceed     My office door was closed  No one else was in the room  She acknowledged consent and understanding of privacy and security of the video platform  The patient has agreed to participate and understands they can discontinue the visit at any time  Patient is aware this is a billable service  Subjective  Roselyn Boo is a 27 y o  female -   Patient calls in today with 1-2 days of sore throat  She does see white spots in there  Does have a history of strep  No other signs to suggest COVID-19 infection         Past Medical History:   Diagnosis Date    Chest pain     resolved 12/5/16    Congenital heart disease     Exudative tonsillitis     last assessed 9/20/16  documented resolved 12/5/16    Mitral valve prolapse     last assessed 3/26/15    Mitral valve regurgitation        Past Surgical History:   Procedure Laterality Date    ATRIOVENTRICULAR CANAL REPAIR      resolved    CARDIAC SURGERY      MA REPAIR INCISIONAL HERNIA,REDUCIBLE N/A 7/21/2020    Procedure: REPAIR HERNIA VENTRAL;  Surgeon: Santi Lindsey MD;  Location:  MAIN OR;  Service: General    MA REPAIR UMBILICAL YRYM,7+M/A,XICAM N/A 7/21/2020    Procedure: REPAIR HERNIA UMBILICAL;  Surgeon: Santi Lindsey MD;  Location:  MAIN OR;  Service: General    WISDOM TOOTH EXTRACTION         Current Outpatient Medications   Medication Sig Dispense Refill    escitalopram (LEXAPRO) 10 mg tablet TAKE 2 TABLETS BY MOUTH EVERY DAY 60 tablet 5    etonogestrel (NEXPLANON) subdermal implant Inject 1 each under the skin      ibuprofen (MOTRIN) 600 mg tablet Take 1 tablet (600 mg total) by mouth every 6 (six) hours as needed for mild pain or moderate pain 20 tablet 0    ibuprofen (MOTRIN) 600 mg tablet Take 1 tablet (600 mg total) by mouth every 8 (eight) hours as needed for mild pain 90 tablet 5     No current facility-administered medications for this visit  Allergies   Allergen Reactions    Diphenhydramine Shortness Of Breath, Swelling and Rash     Reaction Date:Approx 2007  Pt reports she has had Benadryl since without reaction       Review of Systems   Constitutional: Negative for appetite change, chills, diaphoresis and fever  HENT: Positive for sore throat  Negative for congestion, ear pain, rhinorrhea and sinus pressure  Eyes: Negative for discharge, redness and itching  Respiratory: Negative for cough, shortness of breath and wheezing  Cardiovascular: Negative for chest pain and palpitations          Rapid or slow heart rate Gastrointestinal: Negative for abdominal pain, diarrhea, nausea and vomiting  Video Exam    There were no vitals filed for this visit  Physical Exam  Constitutional:       General: She is not in acute distress  Appearance: Normal appearance  She is not ill-appearing  HENT:      Head: Normocephalic and atraumatic  Nose: Nose normal       Mouth/Throat:      Pharynx: Oropharyngeal exudate and posterior oropharyngeal erythema present  Lymphadenopathy:      Cervical: Cervical adenopathy present  Neurological:      Mental Status: She is alert  Psychiatric:         Mood and Affect: Mood normal          Behavior: Behavior normal           I spent 15 minutes directly with the patient during this visit    Patient Instructions   Rapid strep in office done in parking lot is negative  Will send patient to get COVID testing to be sure not COVID presentation  VIRTUAL VISIT DISCLAIMER    Yari Wall acknowledges that she has consented to an online visit or consultation  She understands that the online visit is based solely on information provided by her, and that, in the absence of a face-to-face physical evaluation by the physician, the diagnosis she receives is both limited and provisional in terms of accuracy and completeness  This is not intended to replace a full medical face-to-face evaluation by the physician  Yari Wall understands and accepts these terms

## 2021-02-04 ENCOUNTER — TELEPHONE (OUTPATIENT)
Dept: FAMILY MEDICINE CLINIC | Facility: CLINIC | Age: 31
End: 2021-02-04

## 2021-02-04 LAB — SARS-COV-2 RNA RESP QL NAA+PROBE: NEGATIVE

## 2021-02-04 NOTE — RESULT ENCOUNTER NOTE
Call patient with lab result-  COVID is negative  Get update on symptoms  Probable viral pharyngitis    Symptomatic treatment

## 2021-02-04 NOTE — TELEPHONE ENCOUNTER
----- Message from Derick Robles MD sent at 2/4/2021 12:47 PM EST -----  Call patient with lab result-  COVID is negative  Get update on symptoms  Probable viral pharyngitis    Symptomatic treatment

## 2021-02-27 NOTE — PATIENT INSTRUCTIONS
Rapid strep in office done in parking lot is negative  Will send patient to get COVID testing to be sure not COVID presentation  Airborne/Contact

## 2021-03-12 ENCOUNTER — TELEPHONE (OUTPATIENT)
Dept: FAMILY MEDICINE CLINIC | Facility: CLINIC | Age: 31
End: 2021-03-12

## 2021-03-12 DIAGNOSIS — F41.9 ANXIETY: Primary | ICD-10-CM

## 2021-03-12 NOTE — TELEPHONE ENCOUNTER
----- Message from Nettie Doe sent at 3/12/2021  1:55 PM EST -----  Regarding: Prescription Question  Contact: [phone]  Hello, I was in lexapro and felt like it wasnt working as well as it should, so we ended up doing the medical marijuana approach, I dont know what actions I have to take but I have tried three different things with the medical card and none has worked    I dont know if I have to cancel my card or not but I would like to go back on my lexapro 20 mg again  [facility] Live  it wasnt until I was off of it for a while to realize how much it actually was helping

## 2021-03-13 RX ORDER — ESCITALOPRAM OXALATE 20 MG/1
20 TABLET ORAL DAILY
Qty: 90 TABLET | Refills: 3 | Status: SHIPPED | OUTPATIENT
Start: 2021-03-13 | End: 2021-09-15 | Stop reason: SDUPTHER

## 2021-03-13 NOTE — TELEPHONE ENCOUNTER
TW sent lexapro 20mg to [facility]----notify pt may use both med marij and lexapro--sometimes the combo works better than either alone

## 2021-03-22 ENCOUNTER — TELEPHONE (OUTPATIENT)
Dept: FAMILY MEDICINE CLINIC | Facility: CLINIC | Age: 31
End: 2021-03-22

## 2021-03-22 DIAGNOSIS — I08.0 MITRAL VALVE INSUFFICIENCY AND AORTIC VALVE INSUFFICIENCY: Primary | ICD-10-CM

## 2021-03-22 RX ORDER — AMOXICILLIN 500 MG/1
CAPSULE ORAL
Qty: 30 CAPSULE | Refills: 0 | Status: SHIPPED | OUTPATIENT
Start: 2021-03-22 | End: 2021-03-22

## 2021-03-22 NOTE — TELEPHONE ENCOUNTER
States she needs Amox abx for dental work--sent to [facility]   I do not see in the chart previous--but states for her heart condition

## 2021-05-10 DIAGNOSIS — F41.9 ANXIETY: ICD-10-CM

## 2021-05-10 RX ORDER — ESCITALOPRAM OXALATE 10 MG/1
TABLET ORAL
Qty: 60 TABLET | Refills: 5 | Status: SHIPPED | OUTPATIENT
Start: 2021-05-10 | End: 2021-08-31

## 2021-05-24 ENCOUNTER — VBI (OUTPATIENT)
Dept: ADMINISTRATIVE | Facility: OTHER | Age: 31
End: 2021-05-24

## 2021-05-24 NOTE — TELEPHONE ENCOUNTER
05/24/21 12:04 PM     See documentation in the VB [employer] SmartForm       Arturo Manjarrez MA [facility] ENCOUNTER  ENDOCRINOLOGY Follow up    Chief Complaint   Patient presents with   • Diabetes Mellitus     Follow up visit       HISTORY OF PRESENT ILLNESS:  Candace Zhang is a 72 year old female who is here for follow up of type 2 diabetes on insulin. Last visit with endocrinology was in June 2020 with me.  Personal complications and co-morbidities of diabetes include: CVA, CKD4, HTN, HLD. She has been doing generally well since last visit. Is accompanied by her daughter. Speaks primarily Danish but did not want an  for today's visit, prefers to have her daughter interpret for her due to difficulties with  services and not being able to have Danish  in the past. She does not speak Mandarin Chinese.  Patient reports she does personally speak English at home, and understands English well. She feels like her daughter can interpret any written literature for her, as she does not read English. She checks her blood sugar inconsistently, only when her daughter comes over and checks for her. She is unwilling to poke her finger by herself and was unable to demonstrate testing 4x daily with trial of sample Latonia in the past; had multiple days with complete lack of data. She has not had any recent hypoglycemia or hypoglycemic symptoms. Her daughter says as long as she eats as she has been this is not an issue. She had pre-appointment labs done for review today. No other diabetes-related symptoms or acute concerns today. Continues on blood pressure and cholesterol medications as prescribed in general, but cannot remember if she took her blood pressure pill already today as her routine was different with the appointment. Blood pressure is elevated at office visit; she has no acute hypertensive symptoms.     REVIEW OF SYSTEMS:  Constitutional: Negative for fever and chills. No recent illness or infections. Weight has remained generally stable over the past few months  per chart review.   Skin: Negative for rash.   HEENT: Negative for ear, nose or throat concerns. No hoarse voice, dysphasia or dysphagia. No globus sensation. No eye concerns. Overdue for updated diabetes eye exam.   Respiratory: Negative for cough, wheezing or shortness of breath. Nonsmoker.   Cardiovascular: Negative for chest pain, chest pressure, palpitations or diaphoresis. Hx HTN, HLD.  Gastrointestinal: Negative for nausea, vomiting, diarrhea or abdominal pain. No bowel change or gastrointestinal discomfort. Hx GERD.   Genitourinary: Negative for dysuria, urgency, frequency, or polyuria. Hx CKD4 and ARF earlier in 2020.    Gynecological: Postmenopausal.  OB History    Para Term  AB Living   2 2 0 0 0 2   SAB TAB Ectopic Molar Multiple Live Births   0 0 0 0 0 2   Extremities: Negative for joint swelling or joint pain. No foot concerns. Hx gout.   Neurologic: Negative for change in sensory or motor function.  Negative for headache. No numbness or tingling. Hx TIA and CVA.   Endocrine: Negative for heat or cold intolerance, weight loss or gain. Hx type 2 diabetes on insulin with hyperglycemia.   Hematological: Negative for easy bleeding, bruising or adenopathy.   Psychiatric: Negative for change in affect, change in mentation or acute sleep disturbance. Hx insomnia.      Outpatient Medications Marked as Taking for the 10/5/20 encounter (Office Visit) with KARL Moura   Medication Sig Dispense Refill   • levETIRAcetam (KEPPRA) 250 MG tablet TAKE 1 TABLET BY MOUTH EVERY 12 HOURS 180 tablet 0   • allopurinol (ZYLOPRIM) 100 MG tablet Take 2 tablets by mouth daily. 180 tablet 1   • amLODIPine (NORVASC) 10 MG tablet Take 1 tablet by mouth daily. 90 tablet 1   • atorvastatin (LIPITOR) 80 MG tablet Take 1 tablet by mouth daily. 90 tablet 3   • clopidogrel (PLAVIX) 75 MG tablet Take 1 tablet by mouth daily. 90 tablet 1   • losartan (COZAAR) 50 MG tablet Take 1 tablet by mouth daily. 90 tablet 1    • zolpidem (AMBIEN) 5 MG tablet Take 1 tablet by mouth nightly. 90 tablet 1   • omeprazole (PRILOSEC) 20 MG capsule Take 1 capsule by mouth daily. 90 capsule 1   • levocetirizine (XYZAL) 5 MG tablet Take 1 tablet by mouth every evening. 90 tablet 1   • Lancets (ONETOUCH DELICA PLUS TIOVNO43J) Misc 1 each 2 times daily (before meals). 100 each 3   • aspirin-acetaminophen-caffeine (EXCEDRIN MIGRAINE) 250-250-65 MG per tablet Take 1 tablet by mouth nightly as needed for Pain. 30 tablet 0   • Multiple Vitamins-Minerals (MULTIVITAMIN & MINERAL PO) Take 1 tablet by mouth daily.     • loratadine (CLARITIN) 10 MG tablet Take 10 mg by mouth daily as needed.         ALLERGIES:  No Known Allergies    Past Medical History:   Diagnosis Date   • Diabetes mellitus (CMS/HCC)     insulin start    • Essential (primary) hypertension    • Other and unspecified hyperlipidemia      Past Surgical History:   Procedure Laterality Date   •  section, classic     • Hysterectomy       Family History   Problem Relation Age of Onset   • Stroke Father    • Diabetes Neg Hx      Social History     Tobacco Use   • Smoking status: Never Smoker   • Smokeless tobacco: Never Used   Substance Use Topics   • Alcohol use: No   • Drug use: No       PHYSICAL EXAMINATION:  Visit Vitals  BP (!) 154/90 (BP Location: LFA - Left forearm, Patient Position: Sitting, Cuff Size: Large Adult)   Pulse 86   Ht 5' 1\" (1.549 m)   Wt 81.9 kg   LMP  (LMP Unknown)   BMI 34.11 kg/m²     Body mass index is 34.11 kg/m².    Wt Readings from Last 4 Encounters:   10/05/20 81.9 kg   20 82.9 kg   20 80.1 kg   20 78.6 kg       GENERAL:  Oriented x3. Appears well developed and well nourished. No acute distress.    EARS: Grossly normal hearing.   SKIN: No acute rash, erythema or inflammation.    LUNGS: Breathing non-labored and non-dyspneic. Respirations regular.    CARDIOVASCULAR: Regular rate.   ABDOMEN: Nondistended.   EXTREMITIES: Normal range of  motion in all 4 extremities. No edema or tenderness in all 4 extremities. No major deformity or malformation in all 4 extremities.  NEUROLOGIC: No tremors or motor deficits. Stable gait.  PSYCHIATRIC: Normal mood and affect. Judgement and memory grossly intact and appropriate.   ENDOCRINE: Non-Cushingoid appearance, no dorsal supraclavicular fat pad.  FOOT EXAM: Deferred.      LABS:  Hemoglobin A1C (%)   Date Value   10/05/2020 6.2 (H)   06/25/2020 6.3 (H)   03/03/2020 9.9 (H)   12/05/2019 12.0 (H)     No results found for: DNNSVWMQ3Q  Glucose (mg/dL)   Date Value   10/05/2020 105 (H)   03/03/2020 83   03/02/2020 97   12/09/2019 218 (H)   12/08/2019 264 (H)   12/07/2019 254 (H)       Creatinine (mg/dL)   Date Value   10/05/2020 2.79 (H)   03/03/2020 2.71 (H)   03/02/2020 2.57 (H)   12/09/2019 2.70 (H)   12/08/2019 3.15 (H)   12/07/2019 2.27 (H)      Lab Results   Component Value Date    GFRNA 17 12/09/2019    GFRA 20 12/09/2019    GFRA 16 12/08/2019    GFRA 24 12/07/2019     ALT/SGPT (Units/L)   Date Value   12/09/2019 21   12/08/2019 19   12/07/2019 21     GPT/ALT (Units/L)   Date Value   03/02/2020 21   03/01/2020 24       AST/SGOT (Units/L)   Date Value   12/09/2019 18   12/08/2019 15   12/07/2019 14     GOT/AST (Units/L)   Date Value   03/02/2020 16   03/01/2020 24        Microalbumin/ Creatinine Ratio (mg/g)   Date Value   08/13/2020 1,562.5 (H)        CALCULATED LDL (mg/dL)   Date Value   03/24/2014 55     LDL (no units)   Date Value   03/01/2020      Comment:     Unable to Calculate LDL       HDL (mg/dL)   Date Value   03/01/2020 40 (L)   12/06/2019 39 (L)       TRIGLYCERIDE (mg/dL)   Date Value   12/06/2019 568 (H)     Triglycerides (mg/dL)   Date Value   03/01/2020 470 (H)       CHOLESTEROL (mg/dL)   Date Value   12/06/2019 202 (H)     Cholesterol (mg/dL)   Date Value   03/01/2020 184       HGB (g/dL)   Date Value   03/03/2020 10.8 (L)   12/09/2019 11.0 (L)      HCT (%)   Date Value   03/03/2020 33.0 (L)    12/09/2019 33.2 (L)        Lab Results   Component Value Date    TSH 2.834 12/06/2019       No results found for: THYPERAB     No results found for: VITD25    IMAGING  N/A    ASSESSMENT/PLAN:  1. Type 2 diabetes mellitus with complications (CMS/[facility])    2. Essential hypertension      DISCUSSION: A1c significantly improved and at goal at 6.3%. Goal HgbA1c <8% without significant hypoglycemia given cardiovascular disease and risk factors.  Again discussed Medicare criteria for coverage; she presently does not meet Medicare criteria for CGM coverage as she has been unable to demonstrate consistent testing 4x daily. Encouraged continuing on current blood pressure medication; will plan to recheck blood pressure with next office visit. Encouraged to continue to follow with nephrology for kidney and blood pressure management as well.     PLAN:  Continue Lantus 55 units at 9am.    Continue Humalog mix 75/25 to 25 units at 9am.  Continue Humalog mix 75/25 15 units at 4pm.   Continue piaglitazone 15mg daily.  Continue testing blood sugar 2x daily. To have Medicare coverage for Freestyle Latonia, testing 4x daily is required.     Reviewed and treatment recommendations. Answered all questions to full satisfaction.      I have spent 20 minutes of time with this patient in which greater than 50% of the time was spent on education, counseling, and coordination of care.       KARL Puentes   Endocrinology Nurse Practitioner   [address]   [facility]

## 2021-07-02 ENCOUNTER — APPOINTMENT (EMERGENCY)
Dept: CT IMAGING | Facility: HOSPITAL | Age: 31
End: 2021-07-02
Payer: COMMERCIAL

## 2021-07-02 ENCOUNTER — HOSPITAL ENCOUNTER (EMERGENCY)
Facility: HOSPITAL | Age: 31
Discharge: HOME | End: 2021-07-03
Attending: EMERGENCY MEDICINE
Payer: COMMERCIAL

## 2021-07-02 VITALS
WEIGHT: 150 LBS | BODY MASS INDEX: 27.6 KG/M2 | DIASTOLIC BLOOD PRESSURE: 80 MMHG | HEIGHT: 62 IN | OXYGEN SATURATION: 100 % | SYSTOLIC BLOOD PRESSURE: 163 MMHG | HEART RATE: 62 BPM | RESPIRATION RATE: 18 BRPM | TEMPERATURE: 98.2 F

## 2021-07-02 DIAGNOSIS — R10.9 ABDOMINAL PAIN: Primary | ICD-10-CM

## 2021-07-02 LAB
ALBUMIN SERPL BCP-MCNC: 3.9 G/DL (ref 3.5–5)
ALP SERPL-CCNC: 78 U/L (ref 46–116)
ALT SERPL W P-5'-P-CCNC: 25 U/L (ref 12–78)
ANION GAP SERPL CALCULATED.3IONS-SCNC: 8 MMOL/L (ref 4–13)
AST SERPL W P-5'-P-CCNC: 17 U/L (ref 5–45)
BASOPHILS # BLD AUTO: 0.03 THOUSANDS/ΜL (ref 0–0.1)
BASOPHILS NFR BLD AUTO: 0 % (ref 0–1)
BILIRUB SERPL-MCNC: 0.2 MG/DL (ref 0.2–1)
BILIRUB UR QL STRIP: NEGATIVE
BUN SERPL-MCNC: 15 MG/DL (ref 5–25)
CALCIUM SERPL-MCNC: 8.6 MG/DL (ref 8.3–10.1)
CHLORIDE SERPL-SCNC: 104 MMOL/L (ref 100–108)
CLARITY UR: CLEAR
CO2 SERPL-SCNC: 27 MMOL/L (ref 21–32)
COLOR UR: YELLOW
CREAT SERPL-MCNC: 0.68 MG/DL (ref 0.6–1.3)
EOSINOPHIL # BLD AUTO: 0.17 THOUSAND/ΜL (ref 0–0.61)
EOSINOPHIL NFR BLD AUTO: 2 % (ref 0–6)
ERYTHROCYTE [DISTWIDTH] IN BLOOD BY AUTOMATED COUNT: 12.2 % (ref 11.6–15.1)
EXT PREG TEST URINE: NEGATIVE
EXT. CONTROL ED NAV: NORMAL
GFR SERPL CREATININE-BSD FRML MDRD: 118 ML/MIN/1.73SQ M
GLUCOSE SERPL-MCNC: 87 MG/DL (ref 65–140)
GLUCOSE UR STRIP-MCNC: NEGATIVE MG/DL
HCT VFR BLD AUTO: 38.7 % (ref 34.8–46.1)
HGB BLD-MCNC: 13.3 G/DL (ref 11.5–15.4)
HGB UR QL STRIP.AUTO: NEGATIVE
IMM GRANULOCYTES # BLD AUTO: 0.03 THOUSAND/UL (ref 0–0.2)
IMM GRANULOCYTES NFR BLD AUTO: 0 % (ref 0–2)
KETONES UR STRIP-MCNC: NEGATIVE MG/DL
LEUKOCYTE ESTERASE UR QL STRIP: NEGATIVE
LIPASE SERPL-CCNC: 145 U/L (ref 73–393)
LYMPHOCYTES # BLD AUTO: 1.58 THOUSANDS/ΜL (ref 0.6–4.47)
LYMPHOCYTES NFR BLD AUTO: 22 % (ref 14–44)
MCH RBC QN AUTO: 30.7 PG (ref 26.8–34.3)
MCHC RBC AUTO-ENTMCNC: 34.4 G/DL (ref 31.4–37.4)
MCV RBC AUTO: 89 FL (ref 82–98)
MONOCYTES # BLD AUTO: 0.62 THOUSAND/ΜL (ref 0.17–1.22)
MONOCYTES NFR BLD AUTO: 9 % (ref 4–12)
NEUTROPHILS # BLD AUTO: 4.63 THOUSANDS/ΜL (ref 1.85–7.62)
NEUTS SEG NFR BLD AUTO: 67 % (ref 43–75)
NITRITE UR QL STRIP: NEGATIVE
NRBC BLD AUTO-RTO: 0 /100 WBCS
PH UR STRIP.AUTO: 6 [PH]
PLATELET # BLD AUTO: 201 THOUSANDS/UL (ref 149–390)
PMV BLD AUTO: 10.3 FL (ref 8.9–12.7)
POTASSIUM SERPL-SCNC: 4 MMOL/L (ref 3.5–5.3)
PROT SERPL-MCNC: 7.8 G/DL (ref 6.4–8.2)
PROT UR STRIP-MCNC: NEGATIVE MG/DL
RBC # BLD AUTO: 4.33 MILLION/UL (ref 3.81–5.12)
SODIUM SERPL-SCNC: 139 MMOL/L (ref 136–145)
SP GR UR STRIP.AUTO: 1.02 (ref 1–1.03)
UROBILINOGEN UR QL STRIP.AUTO: 0.2 E.U./DL
WBC # BLD AUTO: 7.06 THOUSAND/UL (ref 4.31–10.16)

## 2021-07-02 PROCEDURE — 83690 ASSAY OF LIPASE: CPT | Performed by: EMERGENCY MEDICINE

## 2021-07-02 PROCEDURE — 81025 URINE PREGNANCY TEST: CPT | Performed by: EMERGENCY MEDICINE

## 2021-07-02 PROCEDURE — 96361 HYDRATE IV INFUSION ADD-ON: CPT

## 2021-07-02 PROCEDURE — 81003 URINALYSIS AUTO W/O SCOPE: CPT | Performed by: EMERGENCY MEDICINE

## 2021-07-02 PROCEDURE — 96360 HYDRATION IV INFUSION INIT: CPT

## 2021-07-02 PROCEDURE — G1004 CDSM NDSC: HCPCS

## 2021-07-02 PROCEDURE — 99284 EMERGENCY DEPT VISIT MOD MDM: CPT

## 2021-07-02 PROCEDURE — 99284 EMERGENCY DEPT VISIT MOD MDM: CPT | Performed by: EMERGENCY MEDICINE

## 2021-07-02 PROCEDURE — 85025 COMPLETE CBC W/AUTO DIFF WBC: CPT | Performed by: EMERGENCY MEDICINE

## 2021-07-02 PROCEDURE — 80053 COMPREHEN METABOLIC PANEL: CPT | Performed by: EMERGENCY MEDICINE

## 2021-07-02 PROCEDURE — 36415 COLL VENOUS BLD VENIPUNCTURE: CPT | Performed by: EMERGENCY MEDICINE

## 2021-07-02 PROCEDURE — 74177 CT ABD & PELVIS W/CONTRAST: CPT

## 2021-07-02 RX ADMIN — IOHEXOL 100 ML: 350 INJECTION, SOLUTION INTRAVENOUS at 22:26

## 2021-07-02 RX ADMIN — SODIUM CHLORIDE 1000 ML: 0.9 INJECTION, SOLUTION INTRAVENOUS at 21:37

## 2021-07-03 NOTE — ED PROVIDER NOTES
History  Chief Complaint   Patient presents with    Abdominal Pain     Pt with abd  pain x 24 hours  Took a nap today and woke up with pain and nausea  +Diarrhea x once today  26 y/o female with congenital heart disease s/p AV canal repair, mitral stenosis, migraines and s/p remote ventral herniorrhaphy c/o LLQ pain for past 24 hours  This is a dull ache, nonradiating  It is continuous and worsening  There are no exacerbating or relieving factors  Has nausea and had one episode of diarrhea  Notes anorexia, no fever  No recent illness  Patient states this is different from the pain she had with ovarian cyst  Patient has Nexplanon  Has regular menses - the current cycle started yesterday and is lighter than usual           Prior to Admission Medications   Prescriptions Last Dose Informant Patient Reported?  Taking?   escitalopram (LEXAPRO) 10 mg tablet   No No   Sig: TAKE 2 TABLETS BY MOUTH EVERY DAY   escitalopram (LEXAPRO) 20 mg tablet   No No   Sig: Take 1 tablet (20 mg total) by mouth daily   etonogestrel (NEXPLANON) subdermal implant   Yes No   Sig: Inject 1 each under the skin   ibuprofen (MOTRIN) 600 mg tablet   No No   Sig: Take 1 tablet (600 mg total) by mouth every 6 (six) hours as needed for mild pain or moderate pain   ibuprofen (MOTRIN) 600 mg tablet   No No   Sig: Take 1 tablet (600 mg total) by mouth every 8 (eight) hours as needed for mild pain      Facility-Administered Medications: None       Past Medical History:   Diagnosis Date    Chest pain     resolved 12/5/16    Congenital heart disease     Exudative tonsillitis     last assessed 9/20/16  documented resolved 12/5/16    Mitral valve prolapse     last assessed 3/26/15    Mitral valve regurgitation        Past Surgical History:   Procedure Laterality Date    ATRIOVENTRICULAR CANAL REPAIR      resolved    CARDIAC SURGERY      MS REPAIR INCISIONAL HERNIA,REDUCIBLE N/A 7/21/2020    Procedure: REPAIR HERNIA VENTRAL;  Surgeon: Lisa Cross Santi Pritchard MD;  Location:  MAIN OR;  Service: General    NE REPAIR UMBILICAL NDAI,1+K/W,NAIN N/A 7/21/2020    Procedure: REPAIR HERNIA UMBILICAL;  Surgeon: Camilo Muñoz MD;  Location:  MAIN OR;  Service: General    WISDOM TOOTH EXTRACTION         Family History   Problem Relation Age of Onset    Throat cancer Mother     Alcohol abuse Mother     Hypertension Father     Hyperlipidemia Father     Heart murmur Father     Hypertension Maternal Grandfather     Diabetes Maternal Grandfather     Mental illness Family     Substance Abuse Neg Hx         family history     I have reviewed and agree with the history as documented  E-Cigarette/Vaping    E-Cigarette Use Never User      E-Cigarette/Vaping Substances     Social History     Tobacco Use    Smoking status: Never Smoker    Smokeless tobacco: Never Used   Vaping Use    Vaping Use: Never used   Substance Use Topics    Alcohol use: Yes     Comment: documented remote social alcohol use per allscripts    Drug use: No       Review of Systems   Constitutional: Negative  HENT: Negative  Eyes: Negative  Respiratory: Positive for shortness of breath (dyspnea on exertion at times)  Cardiovascular: Negative  Gastrointestinal: Positive for abdominal pain, diarrhea and nausea  Negative for blood in stool, constipation and vomiting  Endocrine: Negative  Genitourinary: Negative  Musculoskeletal: Negative  Skin: Negative  Allergic/Immunologic: Negative  Neurological: Positive for headaches  Hematological: Negative  Psychiatric/Behavioral: Negative  All other systems reviewed and are negative  Physical Exam  Physical Exam  Vitals and nursing note reviewed  Constitutional:       General: She is not in acute distress  Appearance: She is well-developed  She is not ill-appearing or diaphoretic  HENT:      Head: Normocephalic and atraumatic        Mouth/Throat:      Mouth: Mucous membranes are moist  Pharynx: No pharyngeal swelling or oropharyngeal exudate  Eyes:      General: No scleral icterus  Conjunctiva/sclera: Conjunctivae normal       Pupils: Pupils are equal, round, and reactive to light  Cardiovascular:      Rate and Rhythm: Normal rate and regular rhythm  Heart sounds: No murmur heard  Pulmonary:      Effort: Pulmonary effort is normal       Breath sounds: Normal breath sounds  Abdominal:      General: Abdomen is protuberant  A surgical scar is present  Bowel sounds are normal  There is no distension or abdominal bruit  Palpations: Abdomen is soft  There is no fluid wave or mass  Tenderness: There is abdominal tenderness in the right lower quadrant  There is no right CVA tenderness, left CVA tenderness, guarding or rebound  Positive signs include McBurney's sign  Negative signs include Mcnulty's sign, Rovsing's sign and psoas sign  Hernia: No hernia is present  Musculoskeletal:         General: Normal range of motion  Cervical back: Normal range of motion and neck supple  Skin:     General: Skin is warm and dry  Capillary Refill: Capillary refill takes less than 2 seconds  Findings: No rash  Neurological:      General: No focal deficit present  Mental Status: She is alert and oriented to person, place, and time  Cranial Nerves: No cranial nerve deficit  Motor: No weakness  Coordination: Coordination normal       Deep Tendon Reflexes: Reflexes are normal and symmetric     Psychiatric:         Mood and Affect: Mood normal          Behavior: Behavior normal          Vital Signs  ED Triage Vitals [07/02/21 2128]   Temperature Pulse Respirations Blood Pressure SpO2   98 2 °F (36 8 °C) 64 16 153/83 98 %      Temp Source Heart Rate Source Patient Position - Orthostatic VS BP Location FiO2 (%)   Temporal Monitor Sitting Right arm --      Pain Score       --           Vitals:    07/02/21 2128 07/02/21 2205   BP: 153/83 163/80   Pulse: 64 62   Patient Position - Orthostatic VS: Sitting Lying         Visual Acuity      ED Medications  Medications   sodium chloride 0 9 % bolus 1,000 mL (0 mL Intravenous Stopped 7/2/21 2340)   iohexol (OMNIPAQUE) 350 MG/ML injection (SINGLE-DOSE) 100 mL (100 mL Intravenous Given 7/2/21 2226)       Diagnostic Studies  Results Reviewed     Procedure Component Value Units Date/Time    Comprehensive metabolic panel [[id number]] Collected: 07/02/21 2139    Lab Status: Final result Specimen: Blood from Arm, Left Updated: 07/02/21 2202     Sodium 139 mmol/L      Potassium 4 0 mmol/L      Chloride 104 mmol/L      CO2 27 mmol/L      ANION GAP 8 mmol/L      BUN 15 mg/dL      Creatinine 0 68 mg/dL      Glucose 87 mg/dL      Calcium 8 6 mg/dL      AST 17 U/L      ALT 25 U/L      Alkaline Phosphatase 78 U/L      Total Protein 7 8 g/dL      Albumin 3 9 g/dL      Total Bilirubin 0 20 mg/dL      eGFR 118 ml/min/1 73sq m     Narrative:      [place] guidelines for Chronic Kidney Disease (CKD):     Stage 1 with normal or high GFR (GFR > 90 mL/min/1 73 square meters)    Stage 2 Mild CKD (GFR = 60-89 mL/min/1 73 square meters)    Stage 3A Moderate CKD (GFR = 45-59 mL/min/1 73 square meters)    Stage 3B Moderate CKD (GFR = 30-44 mL/min/1 73 square meters)    Stage 4 Severe CKD (GFR = 15-29 mL/min/1 73 square meters)    Stage 5 End Stage CKD (GFR <15 mL/min/1 73 square meters)  Note: GFR calculation is accurate only with a steady state creatinine    Lipase [[id number]]  (Normal) Collected: 07/02/21 2139    Lab Status: Final result Specimen: Blood from Arm, Left Updated: 07/02/21 2202     Lipase 145 u/L     UA (URINE) with reflex to Scope [[id number]] Collected: 07/02/21 2139    Lab Status: Final result Specimen: Urine, Clean Catch Updated: 07/02/21 2145     Color, UA Yellow     Clarity, UA Clear     Specific Gravity, UA 1 025     pH, UA 6 0     Leukocytes, UA Negative     Nitrite, UA Negative     Protein, UA Negative mg/dl      Glucose, UA Negative mg/dl      Ketones, UA Negative mg/dl      Urobilinogen, UA 0 2 E U /dl      Bilirubin, UA Negative     Blood, UA Negative    CBC and differential [[id number]] Collected: 07/02/21 2139    Lab Status: Final result Specimen: Blood from Arm, Left Updated: 07/02/21 2144     WBC 7 06 Thousand/uL      RBC 4 33 Million/uL      Hemoglobin 13 3 g/dL      Hematocrit 38 7 %      MCV 89 fL      MCH 30 7 pg      MCHC 34 4 g/dL      RDW 12 2 %      MPV 10 3 fL      Platelets 778 Thousands/uL      nRBC 0 /100 WBCs      Neutrophils Relative 67 %      Immat GRANS % 0 %      Lymphocytes Relative 22 %      Monocytes Relative 9 %      Eosinophils Relative 2 %      Basophils Relative 0 %      Neutrophils Absolute 4 63 Thousands/µL      Immature Grans Absolute 0 03 Thousand/uL      Lymphocytes Absolute 1 58 Thousands/µL      Monocytes Absolute 0 62 Thousand/µL      Eosinophils Absolute 0 17 Thousand/µL      Basophils Absolute 0 03 Thousands/µL     POCT pregnancy, urine [[id number]]  (Normal) Resulted: 07/02/21 2137    Lab Status: Final result Updated: 07/02/21 2137     EXT PREG TEST UR (Ref: Negative) negative     Control valid                 CT abdomen pelvis with contrast   Final Result by Prosper David MD (07/02 2234)      Normal appendix  No evidence of bowel obstruction, colitis or diverticulitis  Workstation performed: [id number]                    Procedures  Procedures         ED Course  ED Course as of Jul 04 1508   Fri Jul 02, 2021 2211 Signed out to Dr Donald Gloria  If CT scan clear patient is safe for d/c  SBIRT 22yo+      Most Recent Value   SBIRT (22 yo +)   In order to provide better care to our patients, we are screening all of our patients for alcohol and drug use  Would it be okay to ask you these screening questions? Yes Filed at: 07/02/2021 2130   Initial Alcohol Screen: US AUDIT-C    1   How often do you have a drink containing alcohol?  0 Filed at: 07/02/2021 2130   2  How many drinks containing alcohol do you have on a typical day you are drinking? 0 Filed at: 07/02/2021 2130   3a  Male UNDER 65: How often do you have five or more drinks on one occasion? 0 Filed at: 07/02/2021 2130   3b  FEMALE Any Age, or MALE 65+: How often do you have 4 or more drinks on one occassion? 0 Filed at: 07/02/2021 2130   Audit-C Score  0 Filed at: 07/02/2021 2130   FRANCIA: How many times in the past year have you    Used an illegal drug or used a prescription medication for non-medical reasons? Never Filed at: 07/02/2021 2130                    MDM    Disposition  Final diagnoses:   Abdominal pain     Time reflects when diagnosis was documented in both MDM as applicable and the Disposition within this note     Time User Action Codes Description Comment    7/3/2021 12:21 AM Monica Aguilar Add [R10 9] Abdominal pain       ED Disposition     ED Disposition Condition Date/Time Comment    Discharge Stable Sat Jul 3, 2021 12:21 AM Tildon Level discharge to home/self care              Follow-up Information     Follow up With Specialties Details Why Contact Info Additional Information    Kevin Gutiérrez MD Family Medicine Schedule an appointment as soon as possible for a visit   [address]  Emergency Department Emergency Medicine  If symptoms worsen [address] [address]  [address] [facility] Emergency Department, [address] [facility], [facility] [address]          Discharge Medication List as of 7/3/2021 12:21 AM      CONTINUE these medications which have NOT CHANGED    Details   !! escitalopram (LEXAPRO) 10 mg tablet TAKE 2 TABLETS BY MOUTH EVERY DAY, Normal      !! escitalopram (LEXAPRO) 20 mg tablet Take 1 tablet (20 mg total) by mouth daily, Starting Sat 3/13/2021, Normal      etonogestrel (Iva Hashimoto) subdermal implant Inject 1 each under the skin, Historical Med      !! ibuprofen (MOTRIN) 600 mg tablet Take 1 tablet (600 mg total) by mouth every 6 (six) hours as needed for mild pain or moderate pain, Starting Sat 9/19/2020, Normal      !! ibuprofen (MOTRIN) 600 mg tablet Take 1 tablet (600 mg total) by mouth every 8 (eight) hours as needed for mild pain, Starting Tue 2/2/2021, Normal       !! - Potential duplicate medications found  Please discuss with provider  No discharge procedures on file      PDMP Review     None          ED Provider  Electronically Signed by           Nanette Bowman DO  07/04/21 4757

## 2021-08-31 ENCOUNTER — OFFICE VISIT (OUTPATIENT)
Dept: FAMILY MEDICINE CLINIC | Facility: CLINIC | Age: 31
End: 2021-08-31
Payer: COMMERCIAL

## 2021-08-31 VITALS
TEMPERATURE: 97.5 F | HEART RATE: 80 BPM | HEIGHT: 62 IN | DIASTOLIC BLOOD PRESSURE: 72 MMHG | OXYGEN SATURATION: 97 % | BODY MASS INDEX: 28.34 KG/M2 | WEIGHT: 154 LBS | SYSTOLIC BLOOD PRESSURE: 116 MMHG

## 2021-08-31 DIAGNOSIS — Z13.6 SCREENING FOR CARDIOVASCULAR CONDITION: ICD-10-CM

## 2021-08-31 DIAGNOSIS — Z00.00 ANNUAL PHYSICAL EXAM: Primary | ICD-10-CM

## 2021-08-31 DIAGNOSIS — F41.9 ANXIETY: ICD-10-CM

## 2021-08-31 PROBLEM — J02.0 STREP PHARYNGITIS: Status: RESOLVED | Noted: 2017-09-28 | Resolved: 2021-08-31

## 2021-08-31 PROCEDURE — 1036F TOBACCO NON-USER: CPT | Performed by: FAMILY MEDICINE

## 2021-08-31 PROCEDURE — 3008F BODY MASS INDEX DOCD: CPT | Performed by: FAMILY MEDICINE

## 2021-08-31 PROCEDURE — 3725F SCREEN DEPRESSION PERFORMED: CPT | Performed by: FAMILY MEDICINE

## 2021-08-31 PROCEDURE — 99395 PREV VISIT EST AGE 18-39: CPT | Performed by: FAMILY MEDICINE

## 2021-08-31 NOTE — PROGRESS NOTES
[address]    NAME: Elyssa Dangelo  AGE: 27 y o  SEX: female  : 1990     DATE: 2021     Assessment and Plan:     Problem List Items Addressed This Visit        Other    Anxiety    Relevant Orders    Comprehensive metabolic panel    TSH, 3rd generation with Free T4 reflex      Other Visit Diagnoses     Annual physical exam    -  Primary    BMI 28 0-28 9,adult        Screening for cardiovascular condition        Relevant Orders    Comprehensive metabolic panel    Lipid Panel with Direct LDL reflex       patient doing well on current dose of Lexapro as to anxiety  Immunizations and preventive care screenings were discussed with patient today  Appropriate education was printed on patient's after visit summary  Counseling:  Dental Health: discussed importance of regular tooth brushing, flossing, and dental visits  Injury prevention: discussed safety/seat belts, safety helmets, smoke detectors, carbon dioxide detectors, and smoking near bedding or upholstery  · Exercise: the importance of regular exercise/physical activity was discussed  Recommend exercise 3-5 times per week for at least 30 minutes  Return in about 6 months (around 2022)  Chief Complaint:     Chief Complaint   Patient presents with    Physical Exam     pt is not vaccinated for covid  History of Present Illness:     Adult Annual Physical   Patient here for a comprehensive physical exam  The patient reports no problems  Stable on current dose of Lexapro in regards to anxiety  Patient also concerned about despite good amount of exercise and attempt to watch diet she has not been able to lose weight  Is concerned about thyroid disease  Diet and Physical Activity  · Diet/Nutrition: well balanced diet, limited junk food and consuming 3-5 servings of fruits/vegetables daily  · Exercise: no formal exercise  Depression Screening  PHQ-9 Depression Screening    PHQ-9:   Frequency of the following problems over the past two weeks:      Little interest or pleasure in doing things: 0 - not at all  Feeling down, depressed, or hopeless: 0 - not at all  PHQ-2 Score: 0       General Health  · Sleep: sleeps well and gets 7-8 hours of sleep on average  · Hearing: normal - bilateral   · Vision: no vision problems  · Dental: regular dental visits, brushes teeth once daily and flosses teeth occasionally  /GYN Health  · Last menstrual period: 8/20/21  · Contraceptive method: injectable contraception  · History of STDs?: no      Review of Systems:     Review of Systems   Past Medical History:     Past Medical History:   Diagnosis Date    Chest pain     resolved 12/5/16    Congenital heart disease     Exudative tonsillitis     last assessed 9/20/16  documented resolved 12/5/16    Mitral valve prolapse     last assessed 3/26/15    Mitral valve regurgitation     Strep pharyngitis 9/28/2017      Past Surgical History:     Past Surgical History:   Procedure Laterality Date    ATRIOVENTRICULAR CANAL REPAIR      resolved   [address] N/A 7/21/2020    Procedure: REPAIR HERNIA VENTRAL;  Surgeon: Sameer Soria MD;  Location:  MAIN OR;  Service: General    ME REPAIR UMBILICAL ETKO,5+O/J,OMKJO N/A 7/21/2020    Procedure: REPAIR HERNIA UMBILICAL;  Surgeon: Sameer Soria MD;  Location:  MAIN OR;  Service: General    WISDOM TOOTH EXTRACTION        Social History:     Social History     Socioeconomic History    Marital status: /Civil Union     Spouse name: None    Number of children: None    Years of education: None    Highest education level: None   Occupational History    None   Tobacco Use    Smoking status: Never Smoker    Smokeless tobacco: Never Used   Vaping Use    Vaping Use: Never used   Substance and Sexual Activity    Alcohol use:  Yes Comment: documented remote social alcohol use per allscripts    Drug use: No    Sexual activity: None   Other Topics Concern    None   Social History Narrative    None     Social Determinants of Health     Financial Resource Strain:     Difficulty of Paying Living Expenses:    Food Insecurity:     Worried About Running Out of Food in the Last Year:     Ran Out of Food in the Last Year:    Transportation Needs:     Lack of Transportation (Medical):  Lack of Transportation (Non-Medical):    Physical Activity:     Days of Exercise per Week:     Minutes of Exercise per Session:    Stress:     Feeling of Stress :    Social Connections:     Frequency of Communication with Friends and Family:     Frequency of Social Gatherings with Friends and Family:     Attends [employer] Services:     Active Member of Clubs or Organizations:     Attends Club or Organization Meetings:     Marital Status:    Intimate Partner Violence:     Fear of Current or Ex-Partner:     Emotionally Abused:     Physically Abused:     Sexually Abused:       Family History:     Family History   Problem Relation Age of Onset    Throat cancer Mother     Alcohol abuse Mother     Hypertension Father     Hyperlipidemia Father     Heart murmur Father     Hypertension Maternal Grandfather     Diabetes Maternal Grandfather     Mental illness Family     Substance Abuse Neg Hx         family history      Current Medications:     Current Outpatient Medications   Medication Sig Dispense Refill    escitalopram (LEXAPRO) 20 mg tablet Take 1 tablet (20 mg total) by mouth daily 90 tablet 3    etonogestrel (NEXPLANON) subdermal implant Inject 1 each under the skin      ibuprofen (MOTRIN) 600 mg tablet Take 1 tablet (600 mg total) by mouth every 6 (six) hours as needed for mild pain or moderate pain 20 tablet 0     No current facility-administered medications for this visit  Allergies:      Allergies   Allergen Reactions    Diphenhydramine Shortness Of Breath, Swelling and Rash     Reaction Date:Approx 2007  Pt reports she has had Benadryl since without reaction      Physical Exam:     /72 (BP Location: Right arm, Patient Position: Sitting, Cuff Size: Adult)   Pulse 80   Temp 97 5 °F (36 4 °C)   Ht 5' 2" (1 575 m)   Wt 69 9 kg (154 lb)   SpO2 97%   BMI 28 17 kg/m²     Physical Exam  Vitals and nursing note reviewed  Constitutional:       General: She is not in acute distress  Appearance: She is well-developed  HENT:      Head: Normocephalic and atraumatic  Right Ear: External ear normal       Left Ear: External ear normal       Nose: Nose normal       Mouth/Throat:      Mouth: Mucous membranes are moist    Eyes:      General:         Right eye: No discharge  Left eye: No discharge  Pupils: Pupils are equal, round, and reactive to light  Neck:      Thyroid: No thyromegaly  Cardiovascular:      Rate and Rhythm: Normal rate and regular rhythm  Heart sounds: Normal heart sounds  No murmur heard  Pulmonary:      Effort: Pulmonary effort is normal  No respiratory distress  Breath sounds: Normal breath sounds  No wheezing or rales  Abdominal:      General: Bowel sounds are normal       Palpations: Abdomen is soft  There is no mass  Tenderness: There is no abdominal tenderness  Musculoskeletal:         General: No deformity  Normal range of motion  Cervical back: Normal range of motion and neck supple  Right lower leg: No edema  Left lower leg: No edema  Lymphadenopathy:      Cervical: No cervical adenopathy  Neurological:      Mental Status: She is alert and oriented to person, place, and time     Psychiatric:         Mood and Affect: Mood normal          Behavior: Behavior normal           Bull Kraus MD   [address]

## 2021-08-31 NOTE — PATIENT INSTRUCTIONS
Continue current medications  Check labs as ordered to include CMP, lipid panel, and TSH  Continue to work on decreasing caloric intake, increasing aerobic activity and further weight loss  I would advise getting COVID-19 vaccine  Wellness Visit for Adults   AMBULATORY CARE:   A wellness visit  is when you see your healthcare provider to get screened for health problems  Your healthcare provider will also give you advice on how to stay healthy  Write down your questions so you remember to ask them  Ask your healthcare provider how often you should have a wellness visit  What happens at a wellness visit:  Your healthcare provider will ask about your health, and your family history of health problems  This includes high blood pressure, heart disease, and cancer  He or she will ask if you have symptoms that concern you, if you smoke, and about your mood  You may also be asked about your intake of medicines, supplements, food, and alcohol  Any of the following may be done:  · Your weight  will be checked  Your height may also be checked so your body mass index (BMI) can be calculated  Your BMI shows if you are at a healthy weight  · Your blood pressure  and heart rate will be checked  Your temperature may also be checked  · Blood and urine tests  may be done  Blood tests may be done to check your cholesterol levels  Abnormal cholesterol levels increase your risk for heart disease and stroke  You may also need a blood or urine test to check for diabetes if you are at increased risk  Urine tests may be done to look for signs of an infection or kidney disease  · A physical exam  includes checking your heartbeat and lungs with a stethoscope  Your healthcare provider may also check your skin to look for sun damage  · Screening tests  may be recommended  A screening test is done to check for diseases that may not cause symptoms   The screening tests you may need depend on your age, gender, family history, and lifestyle habits  For example, colorectal screening may be recommended if you are 48years old or older  Screening tests you need if you are a woman:   · A Pap smear  is used to screen for cervical cancer  Pap smears are usually done every 3 to 5 years depending on your age  You may need them more often if you have had abnormal Pap smear test results in the past  Ask your healthcare provider how often you should have a Pap smear  · A mammogram  is an x-ray of your breasts to screen for breast cancer  Experts recommend mammograms every 2 years starting at age 48 years  You may need a mammogram at age 52 years or younger if you have an increased risk for breast cancer  Talk to your healthcare provider about when you should start having mammograms and how often you need them  Vaccines you may need:   · Get an influenza vaccine  every year  The influenza vaccine protects you from the flu  Several types of viruses cause the flu  The viruses change over time, so new vaccines are made each year  · Get a tetanus-diphtheria (Td) booster vaccine  every 10 years  This vaccine protects you against tetanus and diphtheria  Tetanus is a severe infection that may cause painful muscle spasms and lockjaw  Diphtheria is a severe bacterial infection that causes a thick covering in the back of your mouth and throat  · Get a human papillomavirus (HPV) vaccine  if you are female and aged 23 to 32 or male 23 to 24 and never received it  This vaccine protects you from HPV infection  HPV is the most common infection spread by sexual contact  HPV may also cause vaginal, penile, and anal cancers  · Get a pneumococcal vaccine  if you are aged 72 years or older  The pneumococcal vaccine is an injection given to protect you from pneumococcal disease  Pneumococcal disease is an infection caused by pneumococcal bacteria  The infection may cause pneumonia, meningitis, or an ear infection      · Get a shingles vaccine  if you are 60 or older, even if you have had shingles before  The shingles vaccine is an injection to protect you from the varicella-zoster virus  This is the same virus that causes chickenpox  Shingles is a painful rash that develops in people who had chickenpox or have been exposed to the virus  How to eat healthy:  My Plate is a model for planning healthy meals  It shows the types and amounts of foods that should go on your plate  Fruits and vegetables make up about half of your plate, and grains and protein make up the other half  A serving of dairy is included on the side of your plate  The amount of calories and serving sizes you need depends on your age, gender, weight, and height  Examples of healthy foods are listed below:  · Eat a variety of vegetables  such as dark green, red, and orange vegetables  You can also include canned vegetables low in sodium (salt) and frozen vegetables without added butter or sauces  · Eat a variety of fresh fruits , canned fruit in 100% juice, frozen fruit, and dried fruit  · Include whole grains  At least half of the grains you eat should be whole grains  Examples include whole-wheat bread, wheat pasta, brown rice, and whole-grain cereals such as oatmeal     · Eat a variety of protein foods such as seafood (fish and shellfish), lean meat, and poultry without skin (turkey and chicken)  Examples of lean meats include pork leg, shoulder, or tenderloin, and beef round, sirloin, tenderloin, and extra lean ground beef  Other protein foods include eggs and egg substitutes, beans, peas, soy products, nuts, and seeds  · Choose low-fat dairy products such as skim or 1% milk or low-fat yogurt, cheese, and cottage cheese  · Limit unhealthy fats  such as butter, hard margarine, and shortening  Exercise:  Exercise at least 30 minutes per day on most days of the week  Some examples of exercise include walking, biking, dancing, and swimming   You can also fit in more physical activity by taking the stairs instead of the elevator or parking farther away from stores  Include muscle strengthening activities 2 days each week  Regular exercise provides many health benefits  It helps you manage your weight, and decreases your risk for type 2 diabetes, heart disease, stroke, and high blood pressure  Exercise can also help improve your mood  Ask your healthcare provider about the best exercise plan for you  General health and safety guidelines:   · Do not smoke  Nicotine and other chemicals in cigarettes and cigars can cause lung damage  Ask your healthcare provider for information if you currently smoke and need help to quit  E-cigarettes or smokeless tobacco still contain nicotine  Talk to your healthcare provider before you use these products  · Limit alcohol  A drink of alcohol is 12 ounces of beer, 5 ounces of wine, or 1½ ounces of liquor  · Lose weight, if needed  Being overweight increases your risk of certain health conditions  These include heart disease, high blood pressure, type 2 diabetes, and certain types of cancer  · Protect your skin  Do not sunbathe or use tanning beds  Use sunscreen with a SPF 15 or higher  Apply sunscreen at least 15 minutes before you go outside  Reapply sunscreen every 2 hours  Wear protective clothing, hats, and sunglasses when you are outside  · Drive safely  Always wear your seatbelt  Make sure everyone in your car wears a seatbelt  A seatbelt can save your life if you are in an accident  Do not use your cell phone when you are driving  This could distract you and cause an accident  Pull over if you need to make a call or send a text message  · Practice safe sex  Use latex condoms if are sexually active and have more than one partner  Your healthcare provider may recommend screening tests for sexually transmitted infections (STIs)  · Wear helmets, lifejackets, and protective gear    Always wear a helmet when you ride a bike or motorcycle, go skiing, or play sports that could cause a head injury  Wear protective equipment when you play sports  Wear a lifejacket when you are on a boat or doing water sports  © Copyright [employer] 2021 Information is for End User's use only and may not be sold, redistributed or otherwise used for commercial purposes  All illustrations and images included in CareNotes® are the copyrighted property of A D A M [employer]  or [employer]  The above information is an  only  It is not intended as medical advice for individual conditions or treatments  Talk to your doctor, nurse or pharmacist before following any medical regimen to see if it is safe and effective for you  Weight Management   AMBULATORY CARE:   Why it is important to manage your weight:  Being overweight increases your risk of health conditions such as heart disease, high blood pressure, type 2 diabetes, and certain types of cancer  It can also increase your risk for osteoarthritis, sleep apnea, and other respiratory problems  Aim for a slow, steady weight loss  Even a small amount of weight loss can lower your risk of health problems  How to lose weight safely:  A safe and healthy way to lose weight is to eat fewer calories and get regular exercise  · You can lose up about 1 pound a week by decreasing the number of calories you eat by 500 calories each day  You can decrease calories by eating smaller portion sizes or by cutting out high-calorie foods  Read labels to find out how many calories are in the foods you eat  · You can also burn calories with exercise such as walking, swimming, or biking  You will be more likely to keep weight off if you make these changes part of your lifestyle  Exercise at least 30 minutes per day on most days of the week  You can also fit in more physical activity by taking the stairs instead of the elevator or parking farther away from stores   Ask your healthcare provider about the best exercise plan for you  Healthy meal plan for weight management:  A healthy meal plan includes a variety of foods, contains fewer calories, and helps you stay healthy  A healthy meal plan includes the following:     · Eat whole-grain foods more often  A healthy meal plan should contain fiber  Fiber is the part of grains, fruits, and vegetables that is not broken down by your body  Whole-grain foods are healthy and provide extra fiber in your diet  Some examples of whole-grain foods are whole-wheat breads and pastas, oatmeal, brown rice, and bulgur  · Eat a variety of vegetables every day  Include dark, leafy greens such as spinach, kale, honey greens, and mustard greens  Eat yellow and orange vegetables such as carrots, sweet potatoes, and winter squash  · Eat a variety of fruits every day  Choose fresh or canned fruit (canned in its own juice or light syrup) instead of juice  Fruit juice has very little or no fiber  · Eat low-fat dairy foods  Drink fat-free (skim) milk or 1% milk  Eat fat-free yogurt and low-fat cottage cheese  Try low-fat cheeses such as mozzarella and other reduced-fat cheeses  · Choose meat and other protein foods that are low in fat  Choose beans or other legumes such as split peas or lentils  Choose fish, skinless poultry (chicken or turkey), or lean cuts of red meat (beef or pork)  Before you cook meat or poultry, cut off any visible fat  · Use less fat and oil  Try baking foods instead of frying them  Add less fat, such as margarine, sour cream, regular salad dressing and mayonnaise to foods  Eat fewer high-fat foods  Some examples of high-fat foods include french fries, doughnuts, ice cream, and cakes  · Eat fewer sweets  Limit foods and drinks that are high in sugar  This includes candy, cookies, regular soda, and sweetened drinks  Ways to decrease calories:   · Eat smaller portions  ? Use a small plate with smaller servings      ? Do not eat second helpings  ? When you eat at a restaurant, ask for a box and place half of your meal in the box before you eat  ? Share an entrée with someone else  · Replace high-calorie snacks with healthy, low-calorie snacks  ? Choose fresh fruit, vegetables, fat-free rice cakes, or air-popped popcorn instead of potato chips, nuts, or chocolate  ? Choose water or calorie-free drinks instead of soda or sweetened drinks  · Do not shop for groceries when you are hungry  You may be more likely to make unhealthy food choices  Take a grocery list of healthy foods and shop after you have eaten  · Eat regular meals  Do not skip meals  Skipping meals can lead to overeating later in the day  This can make it harder for you to lose weight  Eat a healthy snack in place of a meal if you do not have time to eat a regular meal  Talk with a dietitian to help you create a meal plan and schedule that is right for you  Other things to consider as you try to lose weight:   · Be aware of situations that may give you the urge to overeat, such as eating while watching television  Find ways to avoid these situations  For example, read a book, go for a walk, or do crafts  · Meet with a weight loss support group or friends who are also trying to lose weight  This may help you stay motivated to continue working on your weight loss goals  © Copyright [employer] 2021 Information is for End User's use only and may not be sold, redistributed or otherwise used for commercial purposes  All illustrations and images included in CareNotes® are the copyrighted property of A D A M , Inc  or [employer]   The above information is an  only  It is not intended as medical advice for individual conditions or treatments  Talk to your doctor, nurse or pharmacist before following any medical regimen to see if it is safe and effective for you

## 2021-09-11 LAB
ALBUMIN SERPL-MCNC: 4.8 G/DL (ref 3.9–5)
ALBUMIN/GLOB SERPL: 1.7 {RATIO} (ref 1.2–2.2)
ALP SERPL-CCNC: 74 IU/L (ref 48–121)
ALT SERPL-CCNC: 21 IU/L (ref 0–32)
AST SERPL-CCNC: 23 IU/L (ref 0–40)
BILIRUB SERPL-MCNC: 0.4 MG/DL (ref 0–1.2)
BUN SERPL-MCNC: 12 MG/DL (ref 6–20)
BUN/CREAT SERPL: 16 (ref 9–23)
CALCIUM SERPL-MCNC: 9.1 MG/DL (ref 8.7–10.2)
CHLORIDE SERPL-SCNC: 101 MMOL/L (ref 96–106)
CHOLEST SERPL-MCNC: 224 MG/DL (ref 100–199)
CO2 SERPL-SCNC: 23 MMOL/L (ref 20–29)
CREAT SERPL-MCNC: 0.77 MG/DL (ref 0.57–1)
GLOBULIN SER-MCNC: 2.9 G/DL (ref 1.5–4.5)
GLUCOSE SERPL-MCNC: 89 MG/DL (ref 65–99)
HDLC SERPL-MCNC: 86 MG/DL
LDLC SERPL CALC-MCNC: 123 MG/DL (ref 0–99)
LDLC/HDLC SERPL: 1.4 RATIO (ref 0–3.2)
POTASSIUM SERPL-SCNC: 4.2 MMOL/L (ref 3.5–5.2)
PROT SERPL-MCNC: 7.7 G/DL (ref 6–8.5)
SL AMB EGFR AFRICAN AMERICAN: 120 ML/MIN/1.73
SL AMB EGFR NON AFRICAN AMERICAN: 104 ML/MIN/1.73
SL AMB VLDL CHOLESTEROL CALC: 15 MG/DL (ref 5–40)
SODIUM SERPL-SCNC: 137 MMOL/L (ref 134–144)
TRIGL SERPL-MCNC: 85 MG/DL (ref 0–149)
TSH SERPL DL<=0.005 MIU/L-ACNC: 1.65 UIU/ML (ref 0.45–4.5)

## 2021-09-14 ENCOUNTER — TELEPHONE (OUTPATIENT)
Dept: FAMILY MEDICINE CLINIC | Facility: CLINIC | Age: 31
End: 2021-09-14

## 2021-09-14 NOTE — TELEPHONE ENCOUNTER
----- Message from Olimpia Palumbo MD sent at 9/14/2021  9:36 AM EDT -----  Call patient with lab result-OK- will discuss at [facility] 10/21

## 2021-09-15 DIAGNOSIS — F41.9 ANXIETY: ICD-10-CM

## 2021-09-15 RX ORDER — ESCITALOPRAM OXALATE 20 MG/1
20 TABLET ORAL DAILY
Qty: 90 TABLET | Refills: 3 | Status: SHIPPED | OUTPATIENT
Start: 2021-09-15

## 2021-09-15 NOTE — TELEPHONE ENCOUNTER
Med refill escitalopram (LEXAPRO) 20 mg tablet    Send to [facility] [phone]    * pt stated that when she tried to go pick it up in the pharmacy they said it was cancelled*

## 2021-10-04 ENCOUNTER — OFFICE VISIT (OUTPATIENT)
Dept: FAMILY MEDICINE CLINIC | Facility: CLINIC | Age: 31
End: 2021-10-04
Payer: COMMERCIAL

## 2021-10-04 VITALS
HEART RATE: 74 BPM | OXYGEN SATURATION: 97 % | SYSTOLIC BLOOD PRESSURE: 116 MMHG | DIASTOLIC BLOOD PRESSURE: 70 MMHG | HEIGHT: 62 IN | BODY MASS INDEX: 27.97 KG/M2 | WEIGHT: 152 LBS | TEMPERATURE: 98.5 F

## 2021-10-04 DIAGNOSIS — Z01.818 PRE-OP EXAMINATION: Primary | ICD-10-CM

## 2021-10-04 DIAGNOSIS — Z30.2 STERILIZATION: ICD-10-CM

## 2021-10-04 PROCEDURE — 1036F TOBACCO NON-USER: CPT | Performed by: FAMILY MEDICINE

## 2021-10-04 PROCEDURE — 99213 OFFICE O/P EST LOW 20 MIN: CPT | Performed by: FAMILY MEDICINE

## 2021-10-04 PROCEDURE — 3008F BODY MASS INDEX DOCD: CPT | Performed by: FAMILY MEDICINE

## 2021-10-13 ENCOUNTER — TELEPHONE (OUTPATIENT)
Dept: FAMILY MEDICINE CLINIC | Facility: CLINIC | Age: 31
End: 2021-10-13

## 2022-01-05 ENCOUNTER — APPOINTMENT (EMERGENCY)
Dept: CT IMAGING | Facility: HOSPITAL | Age: 32
End: 2022-01-05
Payer: COMMERCIAL

## 2022-01-05 ENCOUNTER — APPOINTMENT (EMERGENCY)
Dept: RADIOLOGY | Facility: HOSPITAL | Age: 32
End: 2022-01-05
Attending: EMERGENCY MEDICINE
Payer: COMMERCIAL

## 2022-01-05 ENCOUNTER — HOSPITAL ENCOUNTER (EMERGENCY)
Facility: HOSPITAL | Age: 32
Discharge: HOME | End: 2022-01-05
Attending: EMERGENCY MEDICINE | Admitting: EMERGENCY MEDICINE
Payer: COMMERCIAL

## 2022-01-05 VITALS
HEART RATE: 61 BPM | SYSTOLIC BLOOD PRESSURE: 130 MMHG | RESPIRATION RATE: 18 BRPM | OXYGEN SATURATION: 98 % | DIASTOLIC BLOOD PRESSURE: 72 MMHG | TEMPERATURE: 97.8 F

## 2022-01-05 DIAGNOSIS — U07.1 GASTROENTERITIS DUE TO COVID-19 VIRUS: ICD-10-CM

## 2022-01-05 DIAGNOSIS — A08.39 GASTROENTERITIS DUE TO COVID-19 VIRUS: ICD-10-CM

## 2022-01-05 DIAGNOSIS — K29.00 ACUTE GASTRITIS: Primary | ICD-10-CM

## 2022-01-05 LAB
ALBUMIN SERPL BCP-MCNC: 3.7 G/DL (ref 3.5–5)
ALP SERPL-CCNC: 74 U/L (ref 46–116)
ALT SERPL W P-5'-P-CCNC: 28 U/L (ref 12–78)
ANION GAP SERPL CALCULATED.3IONS-SCNC: 8 MMOL/L (ref 4–13)
AST SERPL W P-5'-P-CCNC: 23 U/L (ref 5–45)
ATRIAL RATE: 66 BPM
BASOPHILS # BLD AUTO: 0 THOUSANDS/ΜL (ref 0–0.1)
BASOPHILS NFR BLD AUTO: 0 % (ref 0–1)
BILIRUB SERPL-MCNC: 0.2 MG/DL (ref 0.2–1)
BUN SERPL-MCNC: 9 MG/DL (ref 5–25)
CALCIUM SERPL-MCNC: 8.3 MG/DL (ref 8.3–10.1)
CARDIAC TROPONIN I PNL SERPL HS: 4 NG/L
CHLORIDE SERPL-SCNC: 104 MMOL/L (ref 100–108)
CO2 SERPL-SCNC: 28 MMOL/L (ref 21–32)
CREAT SERPL-MCNC: 0.67 MG/DL (ref 0.6–1.3)
EOSINOPHIL # BLD AUTO: 0.02 THOUSAND/ΜL (ref 0–0.61)
EOSINOPHIL NFR BLD AUTO: 1 % (ref 0–6)
ERYTHROCYTE [DISTWIDTH] IN BLOOD BY AUTOMATED COUNT: 12.8 % (ref 11.6–15.1)
GFR SERPL CREATININE-BSD FRML MDRD: 117 ML/MIN/1.73SQ M
GLUCOSE SERPL-MCNC: 88 MG/DL (ref 65–140)
HCT VFR BLD AUTO: 41.5 % (ref 34.8–46.1)
HGB BLD-MCNC: 13.8 G/DL (ref 11.5–15.4)
IMM GRANULOCYTES # BLD AUTO: 0.01 THOUSAND/UL (ref 0–0.2)
IMM GRANULOCYTES NFR BLD AUTO: 0 % (ref 0–2)
LYMPHOCYTES # BLD AUTO: 0.71 THOUSANDS/ΜL (ref 0.6–4.47)
LYMPHOCYTES NFR BLD AUTO: 29 % (ref 14–44)
MCH RBC QN AUTO: 29.7 PG (ref 26.8–34.3)
MCHC RBC AUTO-ENTMCNC: 33.3 G/DL (ref 31.4–37.4)
MCV RBC AUTO: 89 FL (ref 82–98)
MONOCYTES # BLD AUTO: 0.3 THOUSAND/ΜL (ref 0.17–1.22)
MONOCYTES NFR BLD AUTO: 12 % (ref 4–12)
NEUTROPHILS # BLD AUTO: 1.42 THOUSANDS/ΜL (ref 1.85–7.62)
NEUTS SEG NFR BLD AUTO: 58 % (ref 43–75)
NRBC BLD AUTO-RTO: 0 /100 WBCS
P AXIS: 23 DEGREES
PLATELET # BLD AUTO: 166 THOUSANDS/UL (ref 149–390)
PMV BLD AUTO: 10.4 FL (ref 8.9–12.7)
POTASSIUM SERPL-SCNC: 4.5 MMOL/L (ref 3.5–5.3)
PR INTERVAL: 166 MS
PROT SERPL-MCNC: 7.7 G/DL (ref 6.4–8.2)
QRS AXIS: 267 DEGREES
QRSD INTERVAL: 116 MS
QT INTERVAL: 460 MS
QTC INTERVAL: 482 MS
RBC # BLD AUTO: 4.65 MILLION/UL (ref 3.81–5.12)
SODIUM SERPL-SCNC: 140 MMOL/L (ref 136–145)
T WAVE AXIS: -38 DEGREES
VENTRICULAR RATE: 66 BPM
WBC # BLD AUTO: 2.46 THOUSAND/UL (ref 4.31–10.16)

## 2022-01-05 PROCEDURE — 99284 EMERGENCY DEPT VISIT MOD MDM: CPT | Performed by: EMERGENCY MEDICINE

## 2022-01-05 PROCEDURE — 74177 CT ABD & PELVIS W/CONTRAST: CPT

## 2022-01-05 PROCEDURE — 99284 EMERGENCY DEPT VISIT MOD MDM: CPT

## 2022-01-05 PROCEDURE — 71046 X-RAY EXAM CHEST 2 VIEWS: CPT

## 2022-01-05 PROCEDURE — 85025 COMPLETE CBC W/AUTO DIFF WBC: CPT | Performed by: EMERGENCY MEDICINE

## 2022-01-05 PROCEDURE — 80053 COMPREHEN METABOLIC PANEL: CPT | Performed by: EMERGENCY MEDICINE

## 2022-01-05 PROCEDURE — 36415 COLL VENOUS BLD VENIPUNCTURE: CPT | Performed by: EMERGENCY MEDICINE

## 2022-01-05 PROCEDURE — 93010 ELECTROCARDIOGRAM REPORT: CPT | Performed by: INTERNAL MEDICINE

## 2022-01-05 PROCEDURE — 93005 ELECTROCARDIOGRAM TRACING: CPT

## 2022-01-05 PROCEDURE — 84484 ASSAY OF TROPONIN QUANT: CPT | Performed by: EMERGENCY MEDICINE

## 2022-01-05 PROCEDURE — G1004 CDSM NDSC: HCPCS

## 2022-01-05 RX ORDER — MAGNESIUM HYDROXIDE/ALUMINUM HYDROXICE/SIMETHICONE 120; 1200; 1200 MG/30ML; MG/30ML; MG/30ML
30 SUSPENSION ORAL ONCE
Status: COMPLETED | OUTPATIENT
Start: 2022-01-05 | End: 2022-01-05

## 2022-01-05 RX ORDER — PANTOPRAZOLE SODIUM 40 MG/1
40 TABLET, DELAYED RELEASE ORAL DAILY
Qty: 7 TABLET | Refills: 0 | Status: SHIPPED | OUTPATIENT
Start: 2022-01-05 | End: 2022-01-12

## 2022-01-05 RX ORDER — LIDOCAINE HYDROCHLORIDE 20 MG/ML
10 SOLUTION OROPHARYNGEAL ONCE
Status: COMPLETED | OUTPATIENT
Start: 2022-01-05 | End: 2022-01-05

## 2022-01-05 RX ADMIN — ALUMINUM HYDROXIDE, MAGNESIUM HYDROXIDE, AND SIMETHICONE 30 ML: 200; 200; 20 SUSPENSION ORAL at 10:00

## 2022-01-05 RX ADMIN — LIDOCAINE HYDROCHLORIDE 10 ML: 20 SOLUTION ORAL; TOPICAL at 10:00

## 2022-01-05 RX ADMIN — IOHEXOL 100 ML: 350 INJECTION, SOLUTION INTRAVENOUS at 10:36

## 2022-01-05 NOTE — ED PROVIDER NOTES
History  Chief Complaint   Patient presents with   [employer]     States she has intermittent gas pain since 0300  Denies CP/SOB  Last BM loose and 1 hour pta  Denies any other complaints  Pt is Covid + as of Monday     32year old female presents for evaluation of left upper quadrant abdominal pain which woke her from sleep at approximately 2 am this morning  She has associated nausea, but denies vomiting or diarrhea  Patient was diagnosed with COVID 19 2 days ago  No cough, congestion, fevers or chills  No chest pain or shortness of breath  History provided by:  Patient  Abdominal Pain  Pain location:  LUQ  Pain radiates to:  Does not radiate  Onset quality:  Sudden  Duration:  8 hours  Timing:  Constant  Progression:  Worsening  Chronicity:  New  Context: awakening from sleep    Relieved by:  None tried  Worsened by:  Nothing  Ineffective treatments:  None tried  Associated symptoms: nausea    Associated symptoms: no chills, no constipation, no cough, no diarrhea, no fever, no shortness of breath and no vomiting        Prior to Admission Medications   Prescriptions Last Dose Informant Patient Reported?  Taking?   escitalopram (LEXAPRO) 20 mg tablet   No No   Sig: Take 1 tablet (20 mg total) by mouth daily   etonogestrel (NEXPLANON) subdermal implant   Yes No   Sig: Inject 1 each under the skin   ibuprofen (MOTRIN) 600 mg tablet   No No   Sig: Take 1 tablet (600 mg total) by mouth every 6 (six) hours as needed for mild pain or moderate pain      Facility-Administered Medications: None       Past Medical History:   Diagnosis Date    Chest pain     resolved 12/5/16    Congenital heart disease     Exudative tonsillitis     last assessed 9/20/16  documented resolved 12/5/16    Mitral valve prolapse     last assessed 3/26/15    Mitral valve regurgitation     Strep pharyngitis 9/28/2017       Past Surgical History:   Procedure Laterality Date    ATRIOVENTRICULAR CANAL REPAIR      resolved    CARDIAC SURGERY  DC REPAIR INCISIONAL HERNIA,REDUCIBLE N/A 7/21/2020    Procedure: REPAIR HERNIA VENTRAL;  Surgeon: Deanne Genao MD;  Location: [facility];  Service: General    DC REPAIR UMBILICAL RBTF,8+E/Q,MWZRD N/A 7/21/2020    Procedure: REPAIR HERNIA UMBILICAL;  Surgeon: Deanne Genao MD;  Location: [facility];  Service: General    WISDOM TOOTH EXTRACTION         Family History   Problem Relation Age of Onset    Throat cancer Mother     Alcohol abuse Mother     Hypertension Father     Hyperlipidemia Father     Heart murmur Father     Hypertension Maternal Grandfather     Diabetes Maternal Grandfather     Mental illness Family     Substance Abuse Neg Hx         family history     I have reviewed and agree with the history as documented  E-Cigarette/Vaping    E-Cigarette Use Never User      E-Cigarette/Vaping Substances     Social History     Tobacco Use    Smoking status: Never Smoker    Smokeless tobacco: Never Used   Vaping Use    Vaping Use: Never used   Substance Use Topics    Alcohol use: Yes     Comment: social    Drug use: No       Review of Systems   Constitutional: Negative for chills and fever  HENT: Negative for congestion  Respiratory: Negative for cough and shortness of breath  Gastrointestinal: Positive for abdominal pain and nausea  Negative for constipation, diarrhea and vomiting  Skin: Negative for rash and wound  All other systems reviewed and are negative  Physical Exam  Physical Exam  Vitals and nursing note reviewed  Constitutional:       General: She is not in acute distress  Appearance: She is well-developed  She is not toxic-appearing or diaphoretic  HENT:      Head: Normocephalic and atraumatic  Right Ear: External ear normal       Left Ear: External ear normal       Nose: Nose normal    Eyes:      General: No scleral icterus  Cardiovascular:      Rate and Rhythm: Normal rate and regular rhythm  Heart sounds: Normal heart sounds  Pulmonary:      Effort: Pulmonary effort is normal  No respiratory distress  Breath sounds: Normal breath sounds  Abdominal:      General: There is no distension  Palpations: Abdomen is soft  Tenderness: There is abdominal tenderness in the left upper quadrant  There is no guarding or rebound  Musculoskeletal:         General: No deformity  Normal range of motion  Skin:     General: Skin is warm and dry  Findings: No rash  Neurological:      General: No focal deficit present  Mental Status: She is alert        Gait: Gait normal    Psychiatric:         Mood and Affect: Mood normal          Vital Signs  ED Triage Vitals [01/05/22 0940]   Temperature Pulse Respirations Blood Pressure SpO2   97 8 °F (36 6 °C) 61 18 130/72 98 %      Temp Source Heart Rate Source Patient Position - Orthostatic VS BP Location FiO2 (%)   Temporal Monitor Sitting Left arm --      Pain Score       5           Vitals:    01/05/22 0940   BP: 130/72   Pulse: 61   Patient Position - Orthostatic VS: Sitting         Visual Acuity      ED Medications  Medications   aluminum-magnesium hydroxide-simethicone (MYLANTA) oral suspension 30 mL (30 mL Oral Given 1/5/22 1000)   Lidocaine Viscous HCl (XYLOCAINE) 2 % mucosal solution 10 mL (10 mL Swish & Swallow Given 1/5/22 1000)   iohexol (OMNIPAQUE) 350 MG/ML injection (SINGLE-DOSE) 100 mL (100 mL Intravenous Given 1/5/22 1036)       Diagnostic Studies  Results Reviewed     Procedure Component Value Units Date/Time    HS Troponin 0hr (reflex protocol) [[id number]]  (Normal) Collected: 01/05/22 0957    Lab Status: Final result Specimen: Blood from Arm, Left Updated: 01/05/22 1029     hs TnI 0hr 4 ng/L     Comprehensive metabolic panel [[id number]] Collected: 01/05/22 0957    Lab Status: Final result Specimen: Blood from Arm, Left Updated: 01/05/22 1021     Sodium 140 mmol/L      Potassium 4 5 mmol/L      Chloride 104 mmol/L      CO2 28 mmol/L      ANION GAP 8 mmol/L      BUN 9 mg/dL      Creatinine 0 67 mg/dL      Glucose 88 mg/dL      Calcium 8 3 mg/dL      AST 23 U/L      ALT 28 U/L      Alkaline Phosphatase 74 U/L      Total Protein 7 7 g/dL      Albumin 3 7 g/dL      Total Bilirubin 0 20 mg/dL      eGFR 117 ml/min/1 73sq m     Narrative:      National Kidney Disease Foundation guidelines for Chronic Kidney Disease (CKD):     Stage 1 with normal or high GFR (GFR > 90 mL/min/1 73 square meters)    Stage 2 Mild CKD (GFR = 60-89 mL/min/1 73 square meters)    Stage 3A Moderate CKD (GFR = 45-59 mL/min/1 73 square meters)    Stage 3B Moderate CKD (GFR = 30-44 mL/min/1 73 square meters)    Stage 4 Severe CKD (GFR = 15-29 mL/min/1 73 square meters)    Stage 5 End Stage CKD (GFR <15 mL/min/1 73 square meters)  Note: GFR calculation is accurate only with a steady state creatinine    CBC and differential [[id number]]  (Abnormal) Collected: 01/05/22 0957    Lab Status: Final result Specimen: Blood from Arm, Left Updated: 01/05/22 1006     WBC 2 46 Thousand/uL      RBC 4 65 Million/uL      Hemoglobin 13 8 g/dL      Hematocrit 41 5 %      MCV 89 fL      MCH 29 7 pg      MCHC 33 3 g/dL      RDW 12 8 %      MPV 10 4 fL      Platelets 443 Thousands/uL      nRBC 0 /100 WBCs      Neutrophils Relative 58 %      Immat GRANS % 0 %      Lymphocytes Relative 29 %      Monocytes Relative 12 %      Eosinophils Relative 1 %      Basophils Relative 0 %      Neutrophils Absolute 1 42 Thousands/µL      Immature Grans Absolute 0 01 Thousand/uL      Lymphocytes Absolute 0 71 Thousands/µL      Monocytes Absolute 0 30 Thousand/µL      Eosinophils Absolute 0 02 Thousand/µL      Basophils Absolute 0 00 Thousands/µL                  XR chest 2 views   Final Result by Mk Ramirez MD (01/05 1050)      No acute cardiopulmonary disease                    Workstation performed: [id number]         CT abdomen pelvis with contrast   Final Result by Stefan Carrera MD (01/05 1049)      Mural thickening of the gastric body and antrum potentially reflecting gastritis though infiltrative etiologies are not entirely excluded  Workstation performed: [id number]                    Procedures  ECG 12 Lead Documentation Only    Date/Time: 1/5/2022 9:49 AM  Performed by: Angeles Booth MD  Authorized by: Angeles Booth MD     Indications / Diagnosis:  Abdominal pain  ECG reviewed by me, the ED Provider: yes    Patient location:  ED  Previous ECG:     Previous ECG:  Compared to current    Comparison ECG info:  1/4/20 normal sinus rhythm with RBBB and LAFB, twave inversions in II, III, aVF, V2 and V3    Comparison to previous ECG: new twave inversions in V4 and V5  Interpretation:     Interpretation: abnormal    Rate:     ECG rate:  66    ECG rate assessment: normal    Rhythm:     Rhythm: sinus rhythm    Ectopy:     Ectopy: none    QRS:     QRS axis:  Left    QRS intervals: Wide  Conduction:     Conduction: abnormal      Abnormal conduction: complete RBBB    ST segments:     ST segments:  Normal  T waves:     T waves: inverted      Inverted:  II, III, aVF, V2, V3, V4 and V5             ED Course             HEART Risk Score      Most Recent Value   Heart Score Risk Calculator    History 0 Filed at: 01/05/2022 1033   ECG 1 Filed at: 01/05/2022 1033   Age 0 Filed at: 01/05/2022 1033   Risk Factors 1 Filed at: 01/05/2022 1033   Troponin 0 Filed at: 01/05/2022 1033   HEART Score 2 Filed at: 01/05/2022 1033                                      MDM  Number of Diagnoses or Management Options  Acute gastritis: new and requires workup  Gastroenteritis due to COVID-19 virus: new and requires workup  Diagnosis management comments: 32year old female presents with LUQ abdominal pain which woke her from sleep this morning  EKG with inferolateral twave inversions which had been seen previously; however, now also in V4 and V5  HEART score 2  Labs unremarkable with the exception of leukopenia which is consistent with patient's known COVID infection  CT scan had been performed prior to order as patient was misidentified as another patient who had also been in the [facility] in the ED  Discussed this error with the patient  However, as patient has LUQ abdominal pain and tenderness which woke her from sleep, CT ultimately ordered  CT consistent with gastritis  Improvement in symptoms with GI cocktail  Protonix x 7 days  PCP follow up  Return precautions provided  Amount and/or Complexity of Data Reviewed  Clinical lab tests: ordered and reviewed  Tests in the radiology section of CPT®: ordered and reviewed    Patient Progress  Patient progress: stable      Disposition  Final diagnoses:   Acute gastritis   Gastroenteritis due to COVID-19 virus     Time reflects when diagnosis was documented in both MDM as applicable and the Disposition within this note     Time User Action Codes Description Comment    1/5/2022 10:57 AM Franklin RODRÍGUEZ Add [K29 00] Acute gastritis     1/5/2022 10:57 AM Lou Tamez Add [U07 1,  A08 39] Gastroenteritis due to COVID-19 virus       ED Disposition     ED Disposition Condition Date/Time Comment    Discharge Stable Wed Jan 5, 2022 10:57 AM Haleigh Nowak discharge to home/self care              Follow-up Information     Follow up With Specialties Details Why Contact Info Additional Information    Nichole Faust MD [facility] Medicine Schedule an appointment as soon as possible for a visit in 3 days please schedule a virtual visit for re-evaluation [address]  [address]  [facility] [address]  [address] [facility] Emergency Department Emergency Medicine Go to  If symptoms worsen, oxygen levels remaining < 92%, black or bloody bowel movements [address] [place], [address]  [phone] [address] Emergency Department, [address] [facility] [address]          Discharge Medication List as of 1/5/2022 11:01 AM      START taking these medications    Details   pantoprazole (PROTONIX) 40 mg tablet Take 1 tablet (40 mg total) by mouth daily for 7 days, Starting Wed 1/5/2022, Until Wed 1/12/2022, Normal         CONTINUE these medications which have NOT CHANGED    Details   escitalopram (LEXAPRO) 20 mg tablet Take 1 tablet (20 mg total) by mouth daily, Starting Wed 9/15/2021, Normal      etonogestrel (NEXPLANON) subdermal implant Inject 1 each under the skin, Historical Med      ibuprofen (MOTRIN) 600 mg tablet Take 1 tablet (600 mg total) by mouth every 6 (six) hours as needed for mild pain or moderate pain, Starting Sat 9/19/2020, Normal             No discharge procedures on file      PDMP Review     None          ED Provider  Electronically Signed by           Fausto Preciado MD  01/05/22 0424

## 2022-01-05 NOTE — DISCHARGE INSTRUCTIONS
Gastritis   WHAT YOU NEED TO KNOW:   Gastritis is inflammation or irritation of the lining of your stomach  DISCHARGE INSTRUCTIONS:   Call 911 for any of the following:   · You develop chest pain or shortness of breath  Return to the emergency department if:   · You vomit blood  · You have black or bloody bowel movements  · You have severe stomach or back pain  Contact your healthcare provider if:   · You have a fever  · You have new or worsening symptoms, even after treatment  · You have questions or concerns about your condition or care  Medicines:   · Medicines  may be given to help treat a bacterial infection or decrease stomach acid  · Take your medicine as directed  Contact your healthcare provider if you think your medicine is not helping or if you have side effects  Tell him or her if you are allergic to any medicine  Keep a list of the medicines, vitamins, and herbs you take  Include the amounts, and when and why you take them  Bring the list or the pill bottles to follow-up visits  Carry your medicine list with you in case of an emergency  Manage or prevent gastritis:   · Do not smoke  Nicotine and other chemicals in cigarettes and cigars can make your symptoms worse and cause lung damage  Ask your healthcare provider for information if you currently smoke and need help to quit  E-cigarettes or smokeless tobacco still contain nicotine  Talk to your healthcare provider before you use these products  · Do not drink alcohol  Alcohol can prevent healing and make your gastritis worse  Talk to your healthcare provider if you need help to stop drinking  · Do not take NSAIDs or aspirin unless directed  These and similar medicines can cause irritation  If your healthcare provider says it is okay to take NSAIDs, take them with food  · Do not eat foods that cause irritation  Foods such as oranges and salsa can cause burning or pain   Eat a variety of healthy foods  Examples include fruits (not citrus), vegetables, low-fat dairy products, beans, whole-grain breads, and lean meats and fish  Try to eat small meals, and drink water with your meals  Do not eat for at least 3 hours before you go to bed  · Find ways to relax and decrease stress  Stress can increase stomach acid and make gastritis worse  Activities such as yoga, meditation, or listening to music can help you relax  Spend time with friends, or do things you enjoy  Follow up with your healthcare provider as directed: You may need ongoing tests or treatment, or referral to a gastroenterologist  Write down your questions so you remember to ask them during your visits  © Copyright [employer] 2021 Information is for End User's use only and may not be sold, redistributed or otherwise used for commercial purposes  All illustrations and images included in CareNotes® are the copyrighted property of [employer] Inc  or [employer]   The above information is an  only  It is not intended as medical advice for individual conditions or treatments  Talk to your doctor, nurse or pharmacist before following any medical regimen to see if it is safe and effective for you  COVID-19 (Coronavirus Disease 2019)   WHAT YOU NEED TO KNOW:   Coronavirus disease 2019 (COVID-19) is the disease caused by a coronavirus first discovered in December 2019  Coronaviruses generally cause upper respiratory (nose, throat, and lung) infections, such as a cold  The new virus spreads quickly and easily  The virus can be spread starting 2 days before symptoms even begin  The virus has also changed into several new forms (called variants) since it was discovered  The variants may be more contagious (easily spread) than the original form  Some may also cause more severe illness than others  It is important to follow local, national, and worldwide measures to protect yourself and others from infection    DISCHARGE INSTRUCTIONS:   If you think you or someone you know may be infected:  Do the following to protect others:  · If emergency care is needed,  tell the  about the possible infection, or call ahead and tell the emergency department  · Call a healthcare provider  for instructions if symptoms are mild  Anyone who may be infected should not  arrive without calling first  The provider will need to protect staff members and other patients  · The person who may be infected needs to wear a face covering  while getting medical care  This will help lower the risk of infecting others  Coverings are not used for anyone who is younger than 2 years, has breathing problems, or cannot remove it  The provider can give you instructions for anyone who cannot wear a covering  Call your local emergency number (911 in the [address] [facility]) or an emergency department if:   · You have trouble breathing or shortness of breath at rest     · You have chest pain or pressure that lasts longer than 5 minutes  · You become confused or hard to wake  · Your lips or face are blue  · You have a fever of 104°F (40°C) or higher  Call your doctor if:   · You do not  have symptoms of COVID-19 but had close physical contact within 14 days with someone who tested positive  · You have questions or concerns about your condition or care  Medicines: You may need any of the following for mild symptoms:  · Decongestants  help reduce nasal congestion and help you breathe more easily  If you take decongestant pills, they may make you feel restless or cause problems with your sleep  Do not use decongestant sprays for more than a few days  · Cough suppressants  help reduce coughing  Ask your healthcare provider which type of cough medicine is best for you  · Acetaminophen  decreases pain and fever  It is available without a doctor's order  Ask how much to take and how often to take it  Follow directions   Read the labels of all other medicines you are using to see if they also contain acetaminophen, or ask your doctor or pharmacist  Acetaminophen can cause liver damage if not taken correctly  Do not use more than 4 grams (4,000 milligrams) total of acetaminophen in one day  · NSAIDs , such as ibuprofen, help decrease swelling, pain, and fever  NSAIDs can cause stomach bleeding or kidney problems in certain people  If you take blood thinner medicine, always ask your healthcare provider if NSAIDs are safe for you  Always read the medicine label and follow directions  · Take your medicine as directed  Contact your healthcare provider if you think your medicine is not helping or if you have side effects  Tell him or her if you are allergic to any medicine  Keep a list of the medicines, vitamins, and herbs you take  Include the amounts, and when and why you take them  Bring the list or the pill bottles to follow-up visits  Carry your medicine list with you in case of an emergency  What you need to know about COVID-19 vaccines:  Get a vaccine even if you already had COVID-19  · COVID-19 vaccines are given as a shot in 1 or 2 doses  Some vaccines have emergency use authorization (EUA)  An EUA means the vaccine is not approved but is given because the benefits outweigh the risks  A 2-dose vaccine is fully approved for use in those 16 years or older  This vaccine also has an EUA for adolescents 12 to 15 years  Your healthcare provider can help you understand the benefits and risks  · A third dose is recommended for adults with a weakened immune system who get a 2-dose vaccine  The third dose is given at least 28 days after the second  · Even after you get the vaccine, continue social distancing and other measures  Experts are still learning how well the vaccines work to prevent infection, transmission, and severe illness  Although rare, you can become infected after you get the vaccine   You may also be able to pass the virus to others without knowing you are infected  · After you get the vaccine, check local, national, and international travel rules  Check to see if you need to be tested before you travel  You may also need to quarantine after you return  Some countries require proof of a negative test before you leave  You should also be tested 3 to 5 days after you return from another country  How the 2019 coronavirus spreads: The following are ways the virus is thought to spread, but more information may be coming:  · Droplets are the main way all coronaviruses spread  The virus travels in droplets that form when a person talks, coughs, or sneezes  The droplets can also float in the air for minutes or hours  Infection happens when you breathe in the droplets or get them in your eyes or nose  Close personal contact with an infected person increases your risk for infection  This means being within 6 feet (2 meters) of the person for at least 15 minutes over 24 hours  · Person-to-person contact can spread the virus  For example, a person with the virus on his or her hands can spread it by shaking hands with someone  · The virus can stay on objects and surfaces for a short time  You may become infected by touching the object or surface and then touching your eyes or mouth  · An infected animal may be able to infect a person who touches it  This may happen at live markets or on a farm  Help lower the risk for COVID-19:  The best way to prevent infection is to avoid anyone who is infected, but this can be hard to do  An infected person can spread the virus before signs or symptoms begin, or even if signs or symptoms never develop  The following can help lower the risk for infection:      · Wash your hands often throughout the day  Use soap and water  Rub your soapy hands together, lacing your fingers, for at least 20 seconds  Rinse with warm, running water  Dry your hands with a clean towel or paper towel   Use hand  that contains alcohol if soap and water are not available  Teach children how to wash their hands and use hand   · Cover sneezes and coughs  Turn your face away and cover your mouth and nose with a tissue  Throw the tissue away  Use the bend of your arm if a tissue is not available  Then wash your hands well with soap and water or use hand   Teach children how to cover a cough or sneeze  · Wear a face covering (mask) around anyone who does not live in your home  Use a cloth covering with at least 2 layers  You can also create layers by putting a cloth covering over a disposable non-medical mask  Cover your mouth and your nose  The covering should fit snugly against the bridge of your nose  Securely fasten it under your chin and on the sides of your face  Do not  wear a plastic face shield instead of a covering  Continue social distancing and washing your hands often  A face covering is not a substitute for social distancing safety measures  · Follow worldwide, national, and local social distancing guidelines  Keep at least 6 feet (2 meters) between you and others  Also keep this distance from anyone who comes to your home, such as someone making a delivery  Wear a face covering while you are around others  You will need to wear a covering in restaurants, stores, and other public buildings  You will also need a covering on mass transit, such as a bus, subway, or airplane  Remember to use a covering made from thick material or wear 2 coverings together  · Make a habit of not touching your face  If you get the virus on your hands, you can transfer it to your eyes, nose, or mouth and become infected  You can also transfer it to objects, surfaces, or people  Do not touch your eyes, nose, or mouth without washing your hands first     · Clean and disinfect high-touch surfaces and objects often    Use disinfecting wipes, or make a solution of 4 teaspoons of bleach in 1 quart (4 cups) of water  Clean and disinfect even if you think no one living in or coming to your home is infected with the virus  · Ask about other vaccines you may need  Get the influenza (flu) vaccine as soon as recommended each year, usually starting in September or October  Get the pneumonia vaccine if recommended  Your healthcare provider can tell you if you should also get other vaccines, and when to get them  Follow social distancing guidelines:  National and local social distancing rules vary  Rules may change over time as restrictions are lifted  Restrictions may return if an outbreak happens where you live  It is important to know and follow all current social distancing rules in your area  The following are general guidelines:  · Stay home if you are sick or think you may have COVID-19  It is important to stay home if you are waiting for a testing appointment or for test results  Even if you do not have symptoms, you can pass the virus to others  · Limit trips out of your home  Have food, medicines, and other supplies delivered and left at your door or other area, if possible  Plan trips out of your home so you make the fewest stops possible to limit close personal contact  Keep track of places you go  This will help contact tracers notify others if you become infected  · Avoid close physical contact with anyone who does not live in your home  Do not shake hands with, hug, or kiss a person as a greeting  If you must use public transportation (such as a bus or subway), try to sit or stand away from others  Only allow necessary people into your home  Wear your face covering, and remind others to wear a face covering  Remind them to wash their hands when they arrive and before they leave  Do not  let someone into your home or go to someone's home just to visit  Even if you both do not feel sick, the virus can pass from one of you to the other  · Avoid in-person gatherings and crowds  Gatherings or crowds of 10 or more individuals can cause the virus to spread  Avoid places such as [place], beaches, sporting events, and tourist attractions  For events such as parties, holiday meals, Buddhism services, and conferences, attend virtually (on a computer), if possible  · Ask your healthcare provider for other ways to have appointments  Some providers offer phone, video, or other types of appointments  You may also be able to get prescriptions for a few months of your medicines at a time  · Stay safe if you must go out to work  Keep physical distance between you and other workers as much as possible  Follow your employer's rules so everyone stays safe  If you have COVID-19 and are recovering at home,  healthcare providers will give you specific instructions to follow  The following are general guidelines to remind you how to keep others safe until you are well:  · Wash your hands often  Use soap and water as much as possible  Use hand  that contains alcohol if soap and water are not available  Dry your hands with a clean towel or paper towel  Do not share towels with anyone  If you use paper towels, throw them away in a lined trash can kept in your room or area  Use a covered trash can, if possible  · Do not go out of your home unless it is necessary  Ask someone who is not infected to go out for groceries or supplies, or have them delivered  Do not go to your healthcare provider's office without an appointment  · Only have close physical contact with a person giving direct care, or a baby or child you must care for  Family members and friends should not visit you  If possible, stay in a separate area or room of your home if you live with others  No one should go into the area or room except to give you care  You can visit with others by phone, video chat, e-mail, or similar systems  · Wear a face covering while others are near you    This can help prevent droplets from spreading the virus when you talk, sneeze, or cough  Put the covering on before anyone comes into your room or area  Remind the person to cover his or her nose and mouth before coming in to provide care for you  · Do not share items  Do not share dishes, towels, or other items with anyone  Items need to be washed after you use them  · Protect your baby  Some newborns have tested positive for the virus  It is not known if they became infected before or after birth  The highest risk is when a  has close contact with an infected person  If you are pregnant or breastfeeding, talk to your healthcare provider or obstetrician about any concerns you have  He or she will tell you when to bring your baby in for check-ups and vaccines  He or she will also tell you what to do if you think your baby was infected with the coronavirus  Wash your hands and put on a clean face covering before you breastfeed or care for your baby  · Do not handle live animals unless it is necessary  Some animals, including pets, have been infected with the new coronavirus  Ask someone who is not infected to take care of your pet until you are well  If you must care for a pet, wear a face covering  Wash your hands before and after you give care  Talk to your healthcare provider about how to keep a service animal safe, if needed  · Follow directions from your healthcare provider for being around others after you recover  It is not known if or for how long a recovered person can pass the virus to others  Your provider may give you instructions, such as continuing social distancing and wearing a face covering  He or she will tell you when it is okay to be around others again  This may be 10 to 20 days after symptoms started or you had a positive test  Most symptoms will also need to be gone  Your provider will give you more information      Follow up with your doctor as directed:  Write down your questions so you remember to ask them during your visits  For more information:   · Centers for Disease Control and Prevention  [address] , [address]  Phone: 2- 573 - [phone]  Web Address: [employer] br    © [address] [facility] 2021 Information is for End User's use only and may not be sold, redistributed or otherwise used for commercial purposes  All illustrations and images included in CareNotes® are the copyrighted property of [employer]  or [facility] Gaurang James   The above information is an  only  It is not intended as medical advice for individual conditions or treatments  Talk to your doctor, nurse or pharmacist before following any medical regimen to see if it is safe and effective for you

## 2022-01-05 NOTE — Clinical Note
Aries Fernández was seen and treated in our emergency department on 1/5/2022  Diagnosis:     [address] [facility]  may return to work on return date  She may return on this date: 01/10/2022    Must be fever free for 24 hours prior to returning to work  If you have any questions or concerns, please don't hesitate to call        Lance Westfall MD    ______________________________           _______________          _______________  Hospital Representative                              Date                                Time

## 2022-01-11 ENCOUNTER — TELEPHONE (OUTPATIENT)
Dept: FAMILY MEDICINE CLINIC | Facility: CLINIC | Age: 32
End: 2022-01-11

## 2022-01-11 DIAGNOSIS — K04.7 DENTAL INFECTION: Primary | ICD-10-CM

## 2022-01-11 RX ORDER — AMOXICILLIN 500 MG/1
500 CAPSULE ORAL EVERY 8 HOURS SCHEDULED
Qty: 30 CAPSULE | Refills: 0 | Status: SHIPPED | OUTPATIENT
Start: 2022-01-11 | End: 2022-01-21

## 2022-01-11 NOTE — TELEPHONE ENCOUNTER
voicemail left in the rx line Patient looking for amoxicillin to be send to pharmacy to tooth infection has a dentist appointment next week     [facility] [phone]

## 2022-05-05 ENCOUNTER — VBI (OUTPATIENT)
Dept: ADMINISTRATIVE | Facility: OTHER | Age: 32
End: 2022-05-05

## 2022-10-13 DIAGNOSIS — F41.9 ANXIETY: ICD-10-CM

## 2022-10-13 RX ORDER — ESCITALOPRAM OXALATE 20 MG/1
TABLET ORAL
Qty: 30 TABLET | Refills: 11 | Status: SHIPPED | OUTPATIENT
Start: 2022-10-13

## 2022-10-27 ENCOUNTER — TELEPHONE (OUTPATIENT)
Dept: FAMILY MEDICINE CLINIC | Facility: CLINIC | Age: 32
End: 2022-10-27

## 2022-10-27 DIAGNOSIS — J03.90 EXUDATIVE TONSILLITIS: Primary | ICD-10-CM

## 2022-10-27 RX ORDER — AMOXICILLIN 500 MG/1
500 CAPSULE ORAL EVERY 8 HOURS SCHEDULED
Qty: 30 CAPSULE | Refills: 0 | Status: SHIPPED | OUTPATIENT
Start: 2022-10-27 | End: 2022-11-06

## 2022-10-27 NOTE — TELEPHONE ENCOUNTER
Patient called in because she wants antibiotics for strep throat to be sent into her pharmacy  Patient states she gets it once a year and doesn't recall what we normally prescribe her  Pt aware Dr Remi De La Rosa is out of the office and message will be related to another provider  [facility] [phone]

## 2023-01-04 ENCOUNTER — TELEPHONE (OUTPATIENT)
Dept: FAMILY MEDICINE CLINIC | Facility: CLINIC | Age: 33
End: 2023-01-04

## 2023-01-04 NOTE — TELEPHONE ENCOUNTER
Confirmation Vandana Martell [id number]  Effective: 01/04/2023     Expires: 04/04/2023  Active  Referred From  [address] [place] [address] Practice  Group TNB: [id number]  Provider QV: [id number]  Tax WT: [id number]  [address] [facility] [address]  [place] [address]  Referred To  Isaías  Specialty: Not Available  Tier 3  Group HVD: [id number]  Provider RU: [id number]  Tax EA: [id number]  This referral is valid at any location for the above group  Patient Info  Trae Peralta  [id number]  Female  1990  Josey Rosenthal  Clinical Information  Place of Service  Office  Service Type  Medical Care  Diagnoses  1 I34 0 - nonrheumatic mitral (valve) insufficiency  2 I34 2 - nonrheumatic mitral (valve) stenosis  3 I08 0 - rheumatic disorders of both mitral and aortic valves  Procedures  4 83198 - echocardiography, transthoracic, real-time with image documentation (2d), includes m-mode recording, when performed, complete, with spectral doppler echocardiography, and with color flow doppler echocardiography

## 2023-04-26 ENCOUNTER — VBI (OUTPATIENT)
Dept: ADMINISTRATIVE | Facility: OTHER | Age: 33
End: 2023-04-26

## 2023-08-23 ENCOUNTER — OFFICE VISIT (OUTPATIENT)
Dept: FAMILY MEDICINE CLINIC | Facility: CLINIC | Age: 33
End: 2023-08-23
Payer: COMMERCIAL

## 2023-08-23 VITALS
OXYGEN SATURATION: 98 % | WEIGHT: 157 LBS | DIASTOLIC BLOOD PRESSURE: 71 MMHG | HEIGHT: 62 IN | HEART RATE: 67 BPM | BODY MASS INDEX: 28.89 KG/M2 | SYSTOLIC BLOOD PRESSURE: 115 MMHG | TEMPERATURE: 97.7 F

## 2023-08-23 DIAGNOSIS — Q21.21 PARTIAL ATRIOVENTRICULAR CANAL DEFECT: ICD-10-CM

## 2023-08-23 DIAGNOSIS — Z00.00 ANNUAL PHYSICAL EXAM: Primary | ICD-10-CM

## 2023-08-23 DIAGNOSIS — J01.00 ACUTE NON-RECURRENT MAXILLARY SINUSITIS: ICD-10-CM

## 2023-08-23 DIAGNOSIS — H10.33 ACUTE BACTERIAL CONJUNCTIVITIS OF BOTH EYES: ICD-10-CM

## 2023-08-23 DIAGNOSIS — F41.9 ANXIETY: ICD-10-CM

## 2023-08-23 PROCEDURE — 99395 PREV VISIT EST AGE 18-39: CPT | Performed by: NURSE PRACTITIONER

## 2023-08-23 PROCEDURE — 99214 OFFICE O/P EST MOD 30 MIN: CPT | Performed by: NURSE PRACTITIONER

## 2023-08-23 RX ORDER — AZITHROMYCIN 250 MG/1
TABLET, FILM COATED ORAL
Qty: 6 TABLET | Refills: 0 | Status: SHIPPED | OUTPATIENT
Start: 2023-08-23 | End: 2023-08-27

## 2023-08-23 RX ORDER — ESCITALOPRAM OXALATE 20 MG/1
20 TABLET ORAL DAILY
Qty: 90 TABLET | Refills: 1 | Status: SHIPPED | OUTPATIENT
Start: 2023-08-23

## 2023-08-23 RX ORDER — POLYMYXIN B SULFATE AND TRIMETHOPRIM 1; 10000 MG/ML; [USP'U]/ML
1 SOLUTION OPHTHALMIC EVERY 4 HOURS
Qty: 10 ML | Refills: 0 | Status: SHIPPED | OUTPATIENT
Start: 2023-08-23

## 2023-08-23 NOTE — PROGRESS NOTES
[facility]        NAME: Adrian Gandhi is a 28 y.o. female  : 1990    MRN: [id number]  DATE: 2023  TIME: 9:09 AM    Assessment and Plan   Annual physical exam [Z00.00]  1. Annual physical exam  Lipid panel    Comprehensive metabolic panel    Lipid panel    Comprehensive metabolic panel      2. Partial atrioventricular canal defect        3. Anxiety  escitalopram (LEXAPRO) 20 mg tablet      4. Acute bacterial conjunctivitis of both eyes  polymyxin b-trimethoprim (POLYTRIM) ophthalmic solution      5. Acute non-recurrent maxillary sinusitis  azithromycin (ZITHROMAX) 250 mg tablet            Patient Instructions     Patient Instructions     Continue Lexapro as ordered  Apply eye drops as prescribed for conjunctivitis  Oral antibiotic as ordered for sinusitis  Continue OTC cold and allergy medication as directed  Fasting routine labs as ordered  Call with problems/concerns      Wellness Visit for Adults   AMBULATORY CARE:   A wellness visit  is when you see your healthcare provider to get screened for health problems. Your healthcare provider will also give you advice on how to stay healthy. Write down your questions so you remember to ask them. Ask your healthcare provider how often you should have a wellness visit. What happens at a wellness visit:  Your healthcare provider will ask about your health, and your family history of health problems. This includes high blood pressure, heart disease, and cancer. He or she will ask if you have symptoms that concern you, if you smoke, and about your mood. You may also be asked about your intake of medicines, supplements, food, and alcohol. Any of the following may be done:  • Your weight  will be checked. Your height may also be checked so your body mass index (BMI) can be calculated. Your BMI shows if you are at a healthy weight. • Your blood pressure  and heart rate will be checked.  Your temperature may also be checked. • Blood and urine tests  may be done. Blood tests may be done to check your cholesterol levels. Abnormal cholesterol levels increase your risk for heart disease and stroke. You may also need a blood or urine test to check for diabetes if you are at increased risk. Urine tests may be done to look for signs of an infection or kidney disease. • A physical exam  includes checking your heartbeat and lungs with a stethoscope. Your healthcare provider may also check your skin to look for sun damage. • Screening tests  may be recommended. A screening test is done to check for diseases that may not cause symptoms. The screening tests you may need depend on your age, gender, family history, and lifestyle habits. For example, colorectal screening may be recommended if you are 48years old or older. Screening tests you need if you are a woman:   • A Pap smear  is used to screen for cervical cancer. Pap smears are usually done every 3 to 5 years depending on your age. You may need them more often if you have had abnormal Pap smear test results in the past. Ask your healthcare provider how often you should have a Pap smear. • A mammogram  is an x-ray of your breasts to screen for breast cancer. Experts recommend mammograms every 2 years starting at age 48 years. You may need a mammogram at age 52 years or younger if you have an increased risk for breast cancer. Talk to your healthcare provider about when you should start having mammograms and how often you need them. Vaccines you may need:   • Get an influenza vaccine  every year. The influenza vaccine protects you from the flu. Several types of viruses cause the flu. The viruses change over time, so new vaccines are made each year. • Get a tetanus-diphtheria (Td) booster vaccine  every 10 years. This vaccine protects you against tetanus and diphtheria. Tetanus is a severe infection that may cause painful muscle spasms and lockjaw.  Diphtheria is a severe bacterial infection that causes a thick covering in the back of your mouth and throat. • Get a human papillomavirus (HPV) vaccine  if you are female and aged 23 to 32 or male 23 to 24 and never received it. This vaccine protects you from HPV infection. HPV is the most common infection spread by sexual contact. HPV may also cause vaginal, penile, and anal cancers. • Get a pneumococcal vaccine  if you are aged 72 years or older. The pneumococcal vaccine is an injection given to protect you from pneumococcal disease. Pneumococcal disease is an infection caused by pneumococcal bacteria. The infection may cause pneumonia, meningitis, or an ear infection. • Get a shingles vaccine  if you are 60 or older, even if you have had shingles before. The shingles vaccine is an injection to protect you from the varicella-zoster virus. This is the same virus that causes chickenpox. Shingles is a painful rash that develops in people who had chickenpox or have been exposed to the virus. How to eat healthy:  My Plate is a model for planning healthy meals. It shows the types and amounts of foods that should go on your plate. Fruits and vegetables make up about half of your plate, and grains and protein make up the other half. A serving of dairy is included on the side of your plate. The amount of calories and serving sizes you need depends on your age, gender, weight, and height. Examples of healthy foods are listed below:  • Eat a variety of vegetables  such as dark green, red, and orange vegetables. You can also include canned vegetables low in sodium (salt) and frozen vegetables without added butter or sauces. • Eat a variety of fresh fruits , canned fruit in 100% juice, frozen fruit, and dried fruit. • Include whole grains. At least half of the grains you eat should be whole grains.  Examples include whole-wheat bread, wheat pasta, brown rice, and whole-grain cereals such as oatmeal.    • Eat a variety of protein foods such as seafood (fish and shellfish), lean meat, and poultry without skin (turkey and chicken). Examples of lean meats include pork leg, shoulder, or tenderloin, and beef round, sirloin, tenderloin, and extra lean ground beef. Other protein foods include eggs and egg substitutes, beans, peas, soy products, nuts, and seeds. • Choose low-fat dairy products such as skim or 1% milk or low-fat yogurt, cheese, and cottage cheese. • Limit unhealthy fats  such as butter, hard margarine, and shortening. Exercise:  Exercise at least 30 minutes per day on most days of the week. Some examples of exercise include walking, biking, dancing, and swimming. You can also fit in more physical activity by taking the stairs instead of the elevator or parking farther away from stores. Include muscle strengthening activities 2 days each week. Regular exercise provides many health benefits. It helps you manage your weight, and decreases your risk for type 2 diabetes, heart disease, stroke, and high blood pressure. Exercise can also help improve your mood. Ask your healthcare provider about the best exercise plan for you. General health and safety guidelines:   • Do not smoke. Nicotine and other chemicals in cigarettes and cigars can cause lung damage. Ask your healthcare provider for information if you currently smoke and need help to quit. E-cigarettes or smokeless tobacco still contain nicotine. Talk to your healthcare provider before you use these products. • Limit alcohol. A drink of alcohol is 12 ounces of beer, 5 ounces of wine, or 1½ ounces of liquor. • Lose weight, if needed. Being overweight increases your risk of certain health conditions. These include heart disease, high blood pressure, type 2 diabetes, and certain types of cancer. • Protect your skin. Do not sunbathe or use tanning beds. Use sunscreen with a SPF 15 or higher. Apply sunscreen at least 15 minutes before you go outside. Reapply sunscreen every 2 hours. Wear protective clothing, hats, and sunglasses when you are outside. • Drive safely. Always wear your seatbelt. Make sure everyone in your car wears a seatbelt. A seatbelt can save your life if you are in an accident. Do not use your cell phone when you are driving. This could distract you and cause an accident. Pull over if you need to make a call or send a text message. • Practice safe sex. Use latex condoms if are sexually active and have more than one partner. Your healthcare provider may recommend screening tests for sexually transmitted infections (STIs). • Wear helmets, lifejackets, and protective gear. Always wear a helmet when you ride a bike or motorcycle, go skiing, or play sports that could cause a head injury. Wear protective equipment when you play sports. Wear a lifejacket when you are on a boat or doing water sports. © Copyright [facility] 2022 Information is for End User's use only and may not be sold, redistributed or otherwise used for commercial purposes. The above information is an  only. It is not intended as medical advice for individual conditions or treatments. Talk to your doctor, nurse or pharmacist before following any medical regimen to see if it is safe and effective for you. Chief Complaint     Chief Complaint   Patient presents with   • Conjunctivitis     Both eyes   • Sinus Problem   • Physical Exam         History of Present Illness       C/o pink eye both eyes, sinus pain and pressure x 1-2 weeks. No fever. Taking allergy medication and mucinex with little relief. Medication management for anxiety-taking Lexapro 20 mg daily-doing well on medication. Cardiac history-followed by cardioology      Review of Systems   Review of Systems   Constitutional: Negative for activity change, chills, fatigue and fever. HENT: Positive for congestion, postnasal drip, rhinorrhea, sinus pressure, sinus pain and sore throat. Negative for ear pain. Eyes: Positive for discharge, redness and itching. Negative for pain. Respiratory: Positive for cough. Negative for wheezing. Cardiovascular: Negative for chest pain. Gastrointestinal: Negative for constipation, diarrhea, nausea and vomiting. Musculoskeletal: Negative for myalgias. Skin: Negative for rash. Neurological: Positive for headaches. Negative for dizziness. Psychiatric/Behavioral: Negative for dysphoric mood.          Current Medications       Current Outpatient Medications:   •  azithromycin (ZITHROMAX) 250 mg tablet, Take 2 tablets today then 1 tablet daily x 4 days, Disp: 6 tablet, Rfl: 0  •  escitalopram (LEXAPRO) 20 mg tablet, Take 1 tablet (20 mg total) by mouth daily, Disp: 90 tablet, Rfl: 1  •  ibuprofen (MOTRIN) 600 mg tablet, Take 1 tablet (600 mg total) by mouth every 6 (six) hours as needed for mild pain or moderate pain, Disp: 20 tablet, Rfl: 0  •  polymyxin b-trimethoprim (POLYTRIM) ophthalmic solution, Administer 1 drop to both eyes every 4 (four) hours, Disp: 10 mL, Rfl: 0    Current Allergies     Allergies as of 08/23/2023 - Reviewed 08/23/2023   Allergen Reaction Noted   • Diphenhydramine Shortness Of Breath, Swelling, and Rash 04/08/2015            The following portions of the patient's history were reviewed and updated as appropriate: allergies, current medications, past family history, past medical history, past social history, past surgical history and problem list.     Past Medical History:   Diagnosis Date   • Chest pain     resolved 12/5/16   • Congenital heart disease    • Exudative tonsillitis     last assessed 9/20/16  documented resolved 12/5/16   • Mitral valve prolapse     last assessed 3/26/15   • Mitral valve regurgitation    • Strep pharyngitis 9/28/2017       Past Surgical History:   Procedure Laterality Date   • ATRIOVENTRICULAR CANAL REPAIR      resolved   • CARDIAC SURGERY     • NV REPAIR FIRST ABDOMINAL WALL HERNIA N/A 7/21/2020    Procedure: REPAIR HERNIA VENTRAL;  Surgeon: Sameer Finn MD;  Location: [facility];  Service: General   • MT RPR UMBILICAL HRNA 5 YRS/> REDUCIBLE N/A 7/21/2020    Procedure: REPAIR HERNIA UMBILICAL;  Surgeon: Sameer Finn MD;  Location: [facility];  Service: General   • WISDOM TOOTH EXTRACTION         Family History   Problem Relation Age of Onset   • Throat cancer Mother    • Alcohol abuse Mother    • Hypertension Father    • Hyperlipidemia Father    • Heart murmur Father    • Hypertension Maternal Grandfather    • Diabetes Maternal Grandfather    • Mental illness Family    • Substance Abuse Neg Hx         family history         Medications have been verified. Objective   /71 (BP Location: Left arm, Patient Position: Sitting, Cuff Size: Standard)   Pulse 67   Temp 97.7 °F (36.5 °C) (Tympanic)   Ht 5' 2" (1.575 m)   Wt 71.2 kg (157 lb)   LMP 08/09/2023   SpO2 98%   Breastfeeding No   BMI 28.72 kg/m²        Physical Exam     Physical Exam  Vitals and nursing note reviewed. Constitutional:       General: She is not in acute distress. Appearance: Normal appearance. She is well-developed. She is ill-appearing. She is not toxic-appearing or diaphoretic. HENT:      Head: Normocephalic and atraumatic. Right Ear: Tympanic membrane, ear canal and external ear normal.      Left Ear: Tympanic membrane, ear canal and external ear normal. There is no impacted cerumen. Nose: Rhinorrhea present. Right Sinus: Maxillary sinus tenderness present. Left Sinus: Maxillary sinus tenderness present. Mouth/Throat:      Mouth: Mucous membranes are moist.      Pharynx: Oropharynx is clear. Uvula midline. Posterior oropharyngeal erythema present. No oropharyngeal exudate. Eyes:      General:         Right eye: Discharge present. Left eye: Discharge present. Extraocular Movements: Extraocular movements intact.       Pupils: Pupils are equal, round, and reactive to light. Cardiovascular:      Rate and Rhythm: Normal rate and regular rhythm. Heart sounds: Normal heart sounds. No murmur heard. No friction rub. No gallop. Pulmonary:      Effort: Pulmonary effort is normal. No respiratory distress. Breath sounds: Normal breath sounds. No wheezing or rales. Abdominal:      General: There is no distension. Musculoskeletal:         General: Normal range of motion. Cervical back: Normal range of motion and neck supple. Skin:     General: Skin is warm and dry. Coloration: Skin is not pale. Findings: No erythema. Neurological:      Mental Status: She is alert and oriented to person, place, and time. Psychiatric:         Mood and Affect: Mood normal.         Behavior: Behavior normal.         Thought Content:  Thought content normal.         Judgment: Judgment normal.           PHQ-2/9 Depression Screening    Little interest or pleasure in doing things: 0 - not at all  Feeling down, depressed, or hopeless: 0 - not at all  PHQ-2 Score: 0  PHQ-2 Interpretation: Negative depression screen

## 2023-08-23 NOTE — PROGRESS NOTES
[address] [place] [address] [address]    NAME: Rich Chris  AGE: 28 y.o. SEX: female  : 1990     DATE: 2023     Assessment and Plan:     Problem List Items Addressed This Visit        Cardiovascular and Mediastinum    Partial atrioventricular canal defect       Other    Anxiety    Relevant Medications    escitalopram (LEXAPRO) 20 mg tablet   Other Visit Diagnoses     Annual physical exam    -  Primary    Relevant Orders    Lipid panel    Comprehensive metabolic panel    Acute bacterial conjunctivitis of both eyes        Relevant Medications    polymyxin b-trimethoprim (POLYTRIM) ophthalmic solution    azithromycin (ZITHROMAX) 250 mg tablet    Acute non-recurrent maxillary sinusitis        Relevant Medications    azithromycin (ZITHROMAX) 250 mg tablet          Immunizations and preventive care screenings were discussed with patient today. Appropriate education was printed on patient's after visit summary. Counseling:  Alcohol/drug use: discussed moderation in alcohol intake, the recommendations for healthy alcohol use, and avoidance of illicit drug use. Dental Health: discussed importance of regular tooth brushing, flossing, and dental visits. Injury prevention: discussed safety/seat belts, safety helmets, smoke detectors, carbon dioxide detectors, and smoking near bedding or upholstery. Sexual health: discussed sexually transmitted diseases, partner selection, use of condoms, avoidance of unintended pregnancy, and contraceptive alternatives. · Exercise: the importance of regular exercise/physical activity was discussed. Recommend exercise 3-5 times per week for at least 30 minutes. BMI Counseling: Body mass index is 28.72 kg/m². The BMI is above normal. Nutrition recommendations include decreasing portion sizes and encouraging healthy choices of fruits and vegetables.  Rationale for BMI follow-up plan is due to patient being overweight or obese. Depression Screening and Follow-up Plan: Patient was screened for depression during today's encounter. They screened negative with a PHQ-2 score of 0. Return if symptoms worsen or fail to improve. Chief Complaint:     Chief Complaint   Patient presents with   • Conjunctivitis     Both eyes   • Sinus Problem   • Physical Exam      History of Present Illness:     Adult Annual Physical   Patient here for a comprehensive physical exam. The patient reports problems - sinus problem. Diet and Physical Activity  · Diet/Nutrition: well balanced diet. · Exercise: 3-4 times a week on average. Depression Screening  PHQ-2/9 Depression Screening    Little interest or pleasure in doing things: 0 - not at all  Feeling down, depressed, or hopeless: 0 - not at all  PHQ-2 Score: 0  PHQ-2 Interpretation: Negative depression screen       General Health  · Sleep: sleeps well. · Hearing: normal - bilateral.  · Vision: no vision problems. · Dental: regular dental visits. /GYN Health  · Last menstrual period: 8/9/23  · Contraceptive method: none. · History of STDs?: no.     Review of Systems:     Review of Systems   Constitutional: Negative for activity change, appetite change, chills, diaphoresis, fatigue and fever. HENT: Positive for congestion, postnasal drip, sinus pressure and sinus pain. Negative for dental problem, drooling, ear pain, facial swelling, hearing loss, mouth sores and nosebleeds. Eyes: Positive for discharge, redness and itching. Negative for pain and visual disturbance. Respiratory: Positive for cough. Negative for apnea, choking, chest tightness, shortness of breath, wheezing and stridor. Cardiovascular: Negative for chest pain, palpitations and leg swelling. Gastrointestinal: Negative for abdominal distention, abdominal pain, anal bleeding, blood in stool, constipation, diarrhea, nausea, rectal pain and vomiting.    Endocrine: Negative for cold intolerance, heat intolerance, polydipsia, polyphagia and polyuria. Genitourinary: Negative for decreased urine volume, difficulty urinating, dyspareunia, dysuria, enuresis, flank pain, frequency, genital sores, hematuria, menstrual problem, pelvic pain, urgency, vaginal bleeding, vaginal discharge and vaginal pain. Musculoskeletal: Negative for arthralgias, back pain, gait problem, joint swelling, myalgias, neck pain and neck stiffness. Skin: Negative for color change, pallor, rash and wound. Allergic/Immunologic: Negative for environmental allergies, food allergies and immunocompromised state. Neurological: Negative for dizziness, tremors, seizures, syncope, speech difficulty, weakness, light-headedness, numbness and headaches. Hematological: Negative for adenopathy. Does not bruise/bleed easily. Psychiatric/Behavioral: Negative for agitation, behavioral problems, confusion, decreased concentration, dysphoric mood, hallucinations, self-injury, sleep disturbance and suicidal ideas. The patient is not nervous/anxious and is not hyperactive.        Past Medical History:     Past Medical History:   Diagnosis Date   • Chest pain     resolved 12/5/16   • Congenital heart disease    • Exudative tonsillitis     last assessed 9/20/16  documented resolved 12/5/16   • Mitral valve prolapse     last assessed 3/26/15   • Mitral valve regurgitation    • Strep pharyngitis 9/28/2017      Past Surgical History:     Past Surgical History:   Procedure Laterality Date   • ATRIOVENTRICULAR CANAL REPAIR      resolved   • CARDIAC SURGERY     • LA REPAIR FIRST ABDOMINAL WALL HERNIA N/A 7/21/2020    Procedure: REPAIR HERNIA VENTRAL;  Surgeon: Yonas Hendricks MD;  Location:  MAIN OR;  Service: General   • LA RPR UMBILICAL HRNA 5 YRS/> REDUCIBLE N/A 7/21/2020    Procedure: REPAIR HERNIA UMBILICAL;  Surgeon: Yonas Hendricks MD;  Location:  MAIN OR;  Service: General   • WISDOM TOOTH EXTRACTION        Social History: Social History     Socioeconomic History   • Marital status: /Civil Union     Spouse name: None   • Number of children: None   • Years of education: None   • Highest education level: None   Occupational History   • None   Tobacco Use   • Smoking status: Never   • Smokeless tobacco: Never   Vaping Use   • Vaping Use: Never used   Substance and Sexual Activity   • Alcohol use: Yes     Comment: social   • Drug use: No   • Sexual activity: None   Other Topics Concern   • None   Social History Narrative   • None     Social Determinants of Health     Financial Resource Strain: Not on file   Food Insecurity: Not on file   Transportation Needs: Not on file   Physical Activity: Not on file   Stress: Not on file   Social Connections: Not on file   Intimate Partner Violence: Not on file   Housing Stability: Not on file      Family History:     Family History   Problem Relation Age of Onset   • Throat cancer Mother    • Alcohol abuse Mother    • Hypertension Father    • Hyperlipidemia Father    • Heart murmur Father    • Hypertension Maternal Grandfather    • Diabetes Maternal Grandfather    • Mental illness Family    • Substance Abuse Neg Hx         family history      Current Medications:     Current Outpatient Medications   Medication Sig Dispense Refill   • azithromycin (ZITHROMAX) 250 mg tablet Take 2 tablets today then 1 tablet daily x 4 days 6 tablet 0   • escitalopram (LEXAPRO) 20 mg tablet Take 1 tablet (20 mg total) by mouth daily 90 tablet 1   • ibuprofen (MOTRIN) 600 mg tablet Take 1 tablet (600 mg total) by mouth every 6 (six) hours as needed for mild pain or moderate pain 20 tablet 0   • polymyxin b-trimethoprim (POLYTRIM) ophthalmic solution Administer 1 drop to both eyes every 4 (four) hours 10 mL 0     No current facility-administered medications for this visit. Allergies:      Allergies   Allergen Reactions   • Diphenhydramine Shortness Of Breath, Swelling and Rash     Reaction Date:Approx 2007  Pt reports she has had Benadryl since without reaction      Physical Exam:     /71 (BP Location: Left arm, Patient Position: Sitting, Cuff Size: Standard)   Pulse 67   Temp 97.7 °F (36.5 °C) (Tympanic)   Ht 5' 2" (1.575 m)   Wt 71.2 kg (157 lb)   LMP 08/09/2023   SpO2 98%   Breastfeeding No   BMI 28.72 kg/m²     Physical Exam  Vitals and nursing note reviewed. Constitutional:       General: She is not in acute distress. Appearance: Normal appearance. She is normal weight. She is not ill-appearing, toxic-appearing or diaphoretic. HENT:      Head: Normocephalic. Right Ear: Tympanic membrane, ear canal and external ear normal. There is no impacted cerumen. Left Ear: Tympanic membrane, ear canal and external ear normal. There is no impacted cerumen. Nose: Rhinorrhea present. No congestion. Right Sinus: Maxillary sinus tenderness present. Left Sinus: Maxillary sinus tenderness present. Mouth/Throat:      Mouth: Mucous membranes are moist.      Pharynx: Oropharynx is clear. Posterior oropharyngeal erythema present. No oropharyngeal exudate. Eyes:      General: No scleral icterus. Right eye: Discharge present. Left eye: Discharge present. Extraocular Movements: Extraocular movements intact. Conjunctiva/sclera: Conjunctivae normal.      Pupils: Pupils are equal, round, and reactive to light. Neck:      Vascular: No carotid bruit. Cardiovascular:      Rate and Rhythm: Normal rate and regular rhythm. Pulses: Normal pulses. Heart sounds: Normal heart sounds. No murmur heard. No friction rub. No gallop. Pulmonary:      Effort: Pulmonary effort is normal. No respiratory distress. Breath sounds: Normal breath sounds. No stridor. No wheezing, rhonchi or rales. Chest:      Chest wall: No tenderness. Abdominal:      General: Abdomen is flat. Bowel sounds are normal. There is no distension.       Palpations: Abdomen is soft. There is no mass. Tenderness: There is no abdominal tenderness. There is no right CVA tenderness, left CVA tenderness, guarding or rebound. Hernia: No hernia is present. Musculoskeletal:         General: No swelling, tenderness, deformity or signs of injury. Normal range of motion. Cervical back: Normal range of motion and neck supple. No rigidity or tenderness. No muscular tenderness. Right lower leg: No edema. Left lower leg: No edema. Lymphadenopathy:      Cervical: No cervical adenopathy. Skin:     General: Skin is warm. Coloration: Skin is not jaundiced or pale. Findings: No bruising, erythema, lesion or rash. Neurological:      General: No focal deficit present. Mental Status: She is alert and oriented to person, place, and time. Cranial Nerves: No cranial nerve deficit. Sensory: No sensory deficit. Motor: No weakness. Coordination: Coordination normal.      Gait: Gait normal.      Deep Tendon Reflexes: Reflexes normal.   Psychiatric:         Mood and Affect: Mood normal.         Behavior: Behavior normal.         Thought Content:  Thought content normal.         Judgment: Judgment normal.          Laura Kothari, [address] [facility]

## 2023-08-23 NOTE — PATIENT INSTRUCTIONS
Continue Lexapro as ordered  Apply eye drops as prescribed for conjunctivitis  Oral antibiotic as ordered for sinusitis  Continue OTC cold and allergy medication as directed  Fasting routine labs as ordered  Call with problems/concerns      Wellness Visit for Adults   AMBULATORY CARE:   A wellness visit  is when you see your healthcare provider to get screened for health problems. Your healthcare provider will also give you advice on how to stay healthy. Write down your questions so you remember to ask them. Ask your healthcare provider how often you should have a wellness visit. What happens at a wellness visit:  Your healthcare provider will ask about your health, and your family history of health problems. This includes high blood pressure, heart disease, and cancer. He or she will ask if you have symptoms that concern you, if you smoke, and about your mood. You may also be asked about your intake of medicines, supplements, food, and alcohol. Any of the following may be done:  • Your weight  will be checked. Your height may also be checked so your body mass index (BMI) can be calculated. Your BMI shows if you are at a healthy weight. • Your blood pressure  and heart rate will be checked. Your temperature may also be checked. • Blood and urine tests  may be done. Blood tests may be done to check your cholesterol levels. Abnormal cholesterol levels increase your risk for heart disease and stroke. You may also need a blood or urine test to check for diabetes if you are at increased risk. Urine tests may be done to look for signs of an infection or kidney disease. • A physical exam  includes checking your heartbeat and lungs with a stethoscope. Your healthcare provider may also check your skin to look for sun damage. • Screening tests  may be recommended. A screening test is done to check for diseases that may not cause symptoms.  The screening tests you may need depend on your age, gender, family history, and lifestyle habits. For example, colorectal screening may be recommended if you are 48years old or older. Screening tests you need if you are a woman:   • A Pap smear  is used to screen for cervical cancer. Pap smears are usually done every 3 to 5 years depending on your age. You may need them more often if you have had abnormal Pap smear test results in the past. Ask your healthcare provider how often you should have a Pap smear. • A mammogram  is an x-ray of your breasts to screen for breast cancer. Experts recommend mammograms every 2 years starting at age 48 years. You may need a mammogram at age 52 years or younger if you have an increased risk for breast cancer. Talk to your healthcare provider about when you should start having mammograms and how often you need them. Vaccines you may need:   • Get an influenza vaccine  every year. The influenza vaccine protects you from the flu. Several types of viruses cause the flu. The viruses change over time, so new vaccines are made each year. • Get a tetanus-diphtheria (Td) booster vaccine  every 10 years. This vaccine protects you against tetanus and diphtheria. Tetanus is a severe infection that may cause painful muscle spasms and lockjaw. Diphtheria is a severe bacterial infection that causes a thick covering in the back of your mouth and throat. • Get a human papillomavirus (HPV) vaccine  if you are female and aged 23 to 32 or male 23 to 24 and never received it. This vaccine protects you from HPV infection. HPV is the most common infection spread by sexual contact. HPV may also cause vaginal, penile, and anal cancers. • Get a pneumococcal vaccine  if you are aged 72 years or older. The pneumococcal vaccine is an injection given to protect you from pneumococcal disease. Pneumococcal disease is an infection caused by pneumococcal bacteria. The infection may cause pneumonia, meningitis, or an ear infection.     • Get a shingles vaccine if you are 60 or older, even if you have had shingles before. The shingles vaccine is an injection to protect you from the varicella-zoster virus. This is the same virus that causes chickenpox. Shingles is a painful rash that develops in people who had chickenpox or have been exposed to the virus. How to eat healthy:  My Plate is a model for planning healthy meals. It shows the types and amounts of foods that should go on your plate. Fruits and vegetables make up about half of your plate, and grains and protein make up the other half. A serving of dairy is included on the side of your plate. The amount of calories and serving sizes you need depends on your age, gender, weight, and height. Examples of healthy foods are listed below:  • Eat a variety of vegetables  such as dark green, red, and orange vegetables. You can also include canned vegetables low in sodium (salt) and frozen vegetables without added butter or sauces. • Eat a variety of fresh fruits , canned fruit in 100% juice, frozen fruit, and dried fruit. • Include whole grains. At least half of the grains you eat should be whole grains. Examples include whole-wheat bread, wheat pasta, brown rice, and whole-grain cereals such as oatmeal.    • Eat a variety of protein foods such as seafood (fish and shellfish), lean meat, and poultry without skin (turkey and chicken). Examples of lean meats include pork leg, shoulder, or tenderloin, and beef round, sirloin, tenderloin, and extra lean ground beef. Other protein foods include eggs and egg substitutes, beans, peas, soy products, nuts, and seeds. • Choose low-fat dairy products such as skim or 1% milk or low-fat yogurt, cheese, and cottage cheese. • Limit unhealthy fats  such as butter, hard margarine, and shortening. Exercise:  Exercise at least 30 minutes per day on most days of the week. Some examples of exercise include walking, biking, dancing, and swimming.  You can also fit in more physical activity by taking the stairs instead of the elevator or parking farther away from stores. Include muscle strengthening activities 2 days each week. Regular exercise provides many health benefits. It helps you manage your weight, and decreases your risk for type 2 diabetes, heart disease, stroke, and high blood pressure. Exercise can also help improve your mood. Ask your healthcare provider about the best exercise plan for you. General health and safety guidelines:   • Do not smoke. Nicotine and other chemicals in cigarettes and cigars can cause lung damage. Ask your healthcare provider for information if you currently smoke and need help to quit. E-cigarettes or smokeless tobacco still contain nicotine. Talk to your healthcare provider before you use these products. • Limit alcohol. A drink of alcohol is 12 ounces of beer, 5 ounces of wine, or 1½ ounces of liquor. • Lose weight, if needed. Being overweight increases your risk of certain health conditions. These include heart disease, high blood pressure, type 2 diabetes, and certain types of cancer. • Protect your skin. Do not sunbathe or use tanning beds. Use sunscreen with a SPF 15 or higher. Apply sunscreen at least 15 minutes before you go outside. Reapply sunscreen every 2 hours. Wear protective clothing, hats, and sunglasses when you are outside. • Drive safely. Always wear your seatbelt. Make sure everyone in your car wears a seatbelt. A seatbelt can save your life if you are in an accident. Do not use your cell phone when you are driving. This could distract you and cause an accident. Pull over if you need to make a call or send a text message. • Practice safe sex. Use latex condoms if are sexually active and have more than one partner. Your healthcare provider may recommend screening tests for sexually transmitted infections (STIs). • Wear helmets, lifejackets, and protective gear.   Always wear a helmet when you ride a bike or motorcycle, go skiing, or play sports that could cause a head injury. Wear protective equipment when you play sports. Wear a lifejacket when you are on a boat or doing water sports. © Copyright [employer] 2022 Information is for End User's use only and may not be sold, redistributed or otherwise used for commercial purposes. The above information is an  only. It is not intended as medical advice for individual conditions or treatments. Talk to your doctor, nurse or pharmacist before following any medical regimen to see if it is safe and effective for you.

## 2024-05-08 ENCOUNTER — VBI (OUTPATIENT)
Dept: ADMINISTRATIVE | Facility: OTHER | Age: 34
End: 2024-05-08

## 2024-07-24 DIAGNOSIS — F41.9 ANXIETY: ICD-10-CM

## 2024-07-24 RX ORDER — ESCITALOPRAM OXALATE 20 MG/1
20 TABLET ORAL DAILY
Qty: 30 TABLET | Refills: 5 | Status: SHIPPED | OUTPATIENT
Start: 2024-07-24

## 2024-09-26 ENCOUNTER — HOSPITAL ENCOUNTER (EMERGENCY)
Facility: HOSPITAL | Age: 34
Discharge: HOME | End: 2024-09-26
Attending: EMERGENCY MEDICINE
Payer: COMMERCIAL

## 2024-09-26 ENCOUNTER — APPOINTMENT (EMERGENCY)
Dept: RADIOLOGY | Facility: HOSPITAL | Age: 34
End: 2024-09-26
Payer: COMMERCIAL

## 2024-09-26 VITALS
HEART RATE: 65 BPM | SYSTOLIC BLOOD PRESSURE: 144 MMHG | DIASTOLIC BLOOD PRESSURE: 75 MMHG | TEMPERATURE: 99 F | OXYGEN SATURATION: 99 % | RESPIRATION RATE: 18 BRPM

## 2024-09-26 DIAGNOSIS — F41.9 ANXIETY: ICD-10-CM

## 2024-09-26 DIAGNOSIS — R00.2 PALPITATIONS: Primary | ICD-10-CM

## 2024-09-26 LAB
ALBUMIN SERPL BCG-MCNC: 4.7 G/DL (ref 3.5–5)
ALP SERPL-CCNC: 53 U/L (ref 34–104)
ALT SERPL W P-5'-P-CCNC: 10 U/L (ref 7–52)
AMPHETAMINES SERPL QL SCN: NEGATIVE
ANION GAP SERPL CALCULATED.3IONS-SCNC: 8 MMOL/L (ref 4–13)
AST SERPL W P-5'-P-CCNC: 14 U/L (ref 13–39)
BARBITURATES UR QL: NEGATIVE
BASOPHILS # BLD AUTO: 0.04 THOUSANDS/ΜL (ref 0–0.1)
BASOPHILS NFR BLD AUTO: 1 % (ref 0–1)
BENZODIAZ UR QL: NEGATIVE
BILIRUB SERPL-MCNC: 0.48 MG/DL (ref 0.2–1)
BILIRUB UR QL STRIP: NEGATIVE
BUN SERPL-MCNC: 13 MG/DL (ref 5–25)
CALCIUM SERPL-MCNC: 9.6 MG/DL (ref 8.4–10.2)
CARDIAC TROPONIN I PNL SERPL HS: <2 NG/L
CHLORIDE SERPL-SCNC: 100 MMOL/L (ref 96–108)
CLARITY UR: CLEAR
CO2 SERPL-SCNC: 28 MMOL/L (ref 21–32)
COCAINE UR QL: NEGATIVE
COLOR UR: ABNORMAL
CREAT SERPL-MCNC: 0.72 MG/DL (ref 0.6–1.3)
D DIMER PPP FEU-MCNC: 0.47 UG/ML FEU
EOSINOPHIL # BLD AUTO: 0.23 THOUSAND/ΜL (ref 0–0.61)
EOSINOPHIL NFR BLD AUTO: 5 % (ref 0–6)
ERYTHROCYTE [DISTWIDTH] IN BLOOD BY AUTOMATED COUNT: 11.9 % (ref 11.6–15.1)
EXT PREGNANCY TEST URINE: NEGATIVE
EXT. CONTROL: NORMAL
FENTANYL UR QL SCN: NEGATIVE
FLUAV AG UPPER RESP QL IA.RAPID: NEGATIVE
FLUBV AG UPPER RESP QL IA.RAPID: NEGATIVE
GFR SERPL CREATININE-BSD FRML MDRD: 110 ML/MIN/1.73SQ M
GLUCOSE SERPL-MCNC: 77 MG/DL (ref 65–140)
GLUCOSE UR STRIP-MCNC: NEGATIVE MG/DL
HCT VFR BLD AUTO: 41.2 % (ref 34.8–46.1)
HGB BLD-MCNC: 14.3 G/DL (ref 11.5–15.4)
HGB UR QL STRIP.AUTO: NEGATIVE
HYDROCODONE UR QL SCN: NEGATIVE
IMM GRANULOCYTES # BLD AUTO: 0.02 THOUSAND/UL (ref 0–0.2)
IMM GRANULOCYTES NFR BLD AUTO: 0 % (ref 0–2)
KETONES UR STRIP-MCNC: NEGATIVE MG/DL
LEUKOCYTE ESTERASE UR QL STRIP: NEGATIVE
LYMPHOCYTES # BLD AUTO: 1.13 THOUSANDS/ΜL (ref 0.6–4.47)
LYMPHOCYTES NFR BLD AUTO: 23 % (ref 14–44)
MAGNESIUM SERPL-MCNC: 1.8 MG/DL (ref 1.9–2.7)
MCH RBC QN AUTO: 30.5 PG (ref 26.8–34.3)
MCHC RBC AUTO-ENTMCNC: 34.7 G/DL (ref 31.4–37.4)
MCV RBC AUTO: 88 FL (ref 82–98)
METHADONE UR QL: NEGATIVE
MONOCYTES # BLD AUTO: 0.31 THOUSAND/ΜL (ref 0.17–1.22)
MONOCYTES NFR BLD AUTO: 6 % (ref 4–12)
NEUTROPHILS # BLD AUTO: 3.26 THOUSANDS/ΜL (ref 1.85–7.62)
NEUTS SEG NFR BLD AUTO: 65 % (ref 43–75)
NITRITE UR QL STRIP: NEGATIVE
NRBC BLD AUTO-RTO: 0 /100 WBCS
OPIATES UR QL SCN: NEGATIVE
OXYCODONE+OXYMORPHONE UR QL SCN: NEGATIVE
PCP UR QL: NEGATIVE
PH UR STRIP.AUTO: 7 [PH]
PLATELET # BLD AUTO: 244 THOUSANDS/UL (ref 149–390)
PMV BLD AUTO: 10 FL (ref 8.9–12.7)
POTASSIUM SERPL-SCNC: 3.7 MMOL/L (ref 3.5–5.3)
PROT SERPL-MCNC: 7.8 G/DL (ref 6.4–8.4)
PROT UR STRIP-MCNC: NEGATIVE MG/DL
RBC # BLD AUTO: 4.69 MILLION/UL (ref 3.81–5.12)
SARS-COV+SARS-COV-2 AG RESP QL IA.RAPID: NEGATIVE
SODIUM SERPL-SCNC: 136 MMOL/L (ref 135–147)
SP GR UR STRIP.AUTO: <1.005 (ref 1–1.03)
THC UR QL: POSITIVE
TSH SERPL DL<=0.05 MIU/L-ACNC: 2.81 UIU/ML (ref 0.45–4.5)
UROBILINOGEN UR STRIP-ACNC: <2 MG/DL
WBC # BLD AUTO: 4.99 THOUSAND/UL (ref 4.31–10.16)

## 2024-09-26 PROCEDURE — 83735 ASSAY OF MAGNESIUM: CPT | Performed by: EMERGENCY MEDICINE

## 2024-09-26 PROCEDURE — 87804 INFLUENZA ASSAY W/OPTIC: CPT | Performed by: EMERGENCY MEDICINE

## 2024-09-26 PROCEDURE — 81003 URINALYSIS AUTO W/O SCOPE: CPT | Performed by: EMERGENCY MEDICINE

## 2024-09-26 PROCEDURE — 93005 ELECTROCARDIOGRAM TRACING: CPT

## 2024-09-26 PROCEDURE — 99285 EMERGENCY DEPT VISIT HI MDM: CPT

## 2024-09-26 PROCEDURE — 80053 COMPREHEN METABOLIC PANEL: CPT | Performed by: EMERGENCY MEDICINE

## 2024-09-26 PROCEDURE — 84443 ASSAY THYROID STIM HORMONE: CPT | Performed by: EMERGENCY MEDICINE

## 2024-09-26 PROCEDURE — 80307 DRUG TEST PRSMV CHEM ANLYZR: CPT | Performed by: EMERGENCY MEDICINE

## 2024-09-26 PROCEDURE — 87811 SARS-COV-2 COVID19 W/OPTIC: CPT | Performed by: EMERGENCY MEDICINE

## 2024-09-26 PROCEDURE — 85025 COMPLETE CBC W/AUTO DIFF WBC: CPT | Performed by: EMERGENCY MEDICINE

## 2024-09-26 PROCEDURE — 85379 FIBRIN DEGRADATION QUANT: CPT | Performed by: EMERGENCY MEDICINE

## 2024-09-26 PROCEDURE — 96360 HYDRATION IV INFUSION INIT: CPT

## 2024-09-26 PROCEDURE — 36415 COLL VENOUS BLD VENIPUNCTURE: CPT | Performed by: EMERGENCY MEDICINE

## 2024-09-26 PROCEDURE — 99285 EMERGENCY DEPT VISIT HI MDM: CPT | Performed by: EMERGENCY MEDICINE

## 2024-09-26 PROCEDURE — 84484 ASSAY OF TROPONIN QUANT: CPT | Performed by: EMERGENCY MEDICINE

## 2024-09-26 PROCEDURE — 81025 URINE PREGNANCY TEST: CPT | Performed by: EMERGENCY MEDICINE

## 2024-09-26 PROCEDURE — 71046 X-RAY EXAM CHEST 2 VIEWS: CPT

## 2024-09-26 RX ADMIN — SODIUM CHLORIDE 1000 ML: 0.9 INJECTION, SOLUTION INTRAVENOUS at 10:04

## 2024-09-26 NOTE — ED PROVIDER NOTES
Final diagnoses:   Palpitations   Anxiety     ED Disposition       ED Disposition   Discharge    Condition   Stable    Date/Time   Thu Sep 26, 2024  1:13 PM    Comment   Maye Barrera discharge to home/self care.                   Assessment & Plan       Medical Decision Making  Obtain lab work, D-dimer, EKG, chest x-ray  Give IV fluids and continue to monitor patient for any worsening symptoms    Lab and radiology results were unremarkable.  Patient felt better after IV fluid hydration.  Heart rate improved as well.  Patient requested to go home.  At this time the etiology of patient's palpitation is unclear however it may be a component of anxiety.  Patient discharged with follow-up to PCP as well as cardiology.  Please take a list of all of your medications and discharge paperwork with you to all of your follow-up medical visits. Please take all of your medications as directed. Please call your family doctor or return to the ER if you have increased shortness of breath, chest pain, fevers, chills, nausea, vomiting, diarrhea, or any other worsening symptoms.      Amount and/or Complexity of Data Reviewed  External Data Reviewed: ECG.  Labs: ordered. Decision-making details documented in ED Course.  Radiology: ordered. Decision-making details documented in ED Course.  ECG/medicine tests: ordered and independent interpretation performed. Decision-making details documented in ED Course.             Medications   sodium chloride 0.9 % bolus 1,000 mL (0 mL Intravenous Stopped 9/26/24 1104)       ED Risk Strat Scores                           SBIRT 20yo+      Flowsheet Row Most Recent Value   Initial Alcohol Screen: US AUDIT-C     1. How often do you have a drink containing alcohol? 0 Filed at: 09/26/2024 0929   2. How many drinks containing alcohol do you have on a typical day you are drinking?  0 Filed at: 09/26/2024 0929   3a. Male UNDER 65: How often do you have five or more drinks on one occasion? 0 Filed at:  09/26/2024 0929   3b. FEMALE Any Age, or MALE 65+: How often do you have 4 or more drinks on one occassion? 0 Filed at: 09/26/2024 0929   Audit-C Score 0 Filed at: 09/26/2024 0929   FRANCIA: How many times in the past year have you...    Used an illegal drug or used a prescription medication for non-medical reasons? Never Filed at: 09/26/2024 0929                            History of Present Illness       Chief Complaint   Patient presents with    Palpitations     Pt to er with reports of palpitations and dizziness for the past 2 months, has been increasing over the past few days. Associated with sob, denies cp        Past Medical History:   Diagnosis Date    Chest pain     resolved 12/5/16    Congenital heart disease     Exudative tonsillitis     last assessed 9/20/16  documented resolved 12/5/16    Mitral valve prolapse     last assessed 3/26/15    Mitral valve regurgitation     Strep pharyngitis 9/28/2017      Past Surgical History:   Procedure Laterality Date    ATRIOVENTRICULAR CANAL REPAIR      resolved    CARDIAC SURGERY      TN REPAIR FIRST ABDOMINAL WALL HERNIA N/A 7/21/2020    Procedure: REPAIR HERNIA VENTRAL;  Surgeon: Justin Danielle MD;  Location:  MAIN OR;  Service: General    TN RPR UMBILICAL HRNA 5 YRS/> REDUCIBLE N/A 7/21/2020    Procedure: REPAIR HERNIA UMBILICAL;  Surgeon: Justin Danielle MD;  Location: [facility];  Service: General    WISDOM TOOTH EXTRACTION        Family History   Problem Relation Age of Onset    Throat cancer Mother     Alcohol abuse Mother     Hypertension Father     Hyperlipidemia Father     Heart murmur Father     Hypertension Maternal Grandfather     Diabetes Maternal Grandfather     Mental illness Family     Substance Abuse Neg Hx         family history      Social History     Tobacco Use    Smoking status: Never    Smokeless tobacco: Never   Vaping Use    Vaping status: Never Used   Substance Use Topics    Alcohol use: Yes     Comment: social    Drug use: Yes      Types: Marijuana      E-Cigarette/Vaping    E-Cigarette Use Never User       E-Cigarette/Vaping Substances      I have reviewed and agree with the history as documented.     33-year-old female with past history of anxiety, depression, congenital heart disease, mitral valve regurg, presents to the ED for evaluation of intermittent palpitation over the past 2 months.  Patient felt like her heart was beating out of her chest this morning.  Subsequently patient came to the ED for further evaluation.  Patient denies any specific chest pain.  Patient denies any shortness of breath,      Palpitations  Associated symptoms: no back pain, no chest pain, no cough, no shortness of breath and no vomiting        Review of Systems   Constitutional:  Negative for chills and fever.   HENT:  Negative for ear pain and sore throat.    Eyes:  Negative for pain and visual disturbance.   Respiratory:  Negative for cough and shortness of breath.    Cardiovascular:  Positive for palpitations. Negative for chest pain.   Gastrointestinal:  Negative for abdominal pain and vomiting.   Genitourinary:  Negative for dysuria and hematuria.   Musculoskeletal:  Negative for arthralgias and back pain.   Skin:  Negative for color change and rash.   Neurological:  Negative for seizures and syncope.   All other systems reviewed and are negative.          Objective       ED Triage Vitals [09/26/24 0928]   Temperature Pulse Blood Pressure Respirations SpO2 Patient Position - Orthostatic VS   99 °F (37.2 °C) 98 155/81 18 99 % Sitting      Temp Source Heart Rate Source BP Location FiO2 (%) Pain Score    Temporal Monitor Right arm -- --      Vitals      Date and Time Temp Pulse SpO2 Resp BP Pain Score FACES Pain Rating User   09/26/24 1300 -- 65 99 % 18 144/75 -- -- RD   09/26/24 1230 -- 66 100 % 20 143/77 -- -- RD   09/26/24 1200 -- 76 99 % 20 141/66 -- -- RD   09/26/24 1100 -- 65 98 % 20 133/68 -- -- AL   09/26/24 0928 99 °F (37.2 °C) 98 99 % 18 155/81 --  -- HR            Physical Exam  Vitals and nursing note reviewed.   Constitutional:       General: She is not in acute distress.     Appearance: She is well-developed.   HENT:      Head: Normocephalic and atraumatic.   Eyes:      Conjunctiva/sclera: Conjunctivae normal.   Cardiovascular:      Rate and Rhythm: Normal rate and regular rhythm.      Heart sounds: No murmur heard.  Pulmonary:      Effort: Pulmonary effort is normal. No respiratory distress.      Breath sounds: Normal breath sounds.      Comments: Lungs are clear to auscultation bilateral.  Abdominal:      Palpations: Abdomen is soft.      Tenderness: There is no abdominal tenderness.   Musculoskeletal:         General: No swelling.      Cervical back: Neck supple.   Skin:     General: Skin is warm and dry.      Capillary Refill: Capillary refill takes less than 2 seconds.   Neurological:      Mental Status: She is alert.   Psychiatric:         Mood and Affect: Mood normal.         Results Reviewed       Procedure Component Value Units Date/Time    Rapid drug screen, urine [[id number]]  (Abnormal) Collected: 09/26/24 1001    Lab Status: Final result Specimen: Urine, Clean Catch Updated: 09/26/24 1208     Amph/Meth UR Negative     Barbiturate Ur Negative     Benzodiazepine Urine Negative     Cocaine Urine Negative     Methadone Urine Negative     Opiate Urine Negative     PCP Ur Negative     THC Urine Positive     Oxycodone Urine Negative     Fentanyl Urine Negative     HYDROCODONE URINE Negative    Narrative:      Presumptive report. If requested, specimen will be sent to reference lab for confirmation.  FOR MEDICAL PURPOSES ONLY.   IF CONFIRMATION NEEDED PLEASE CONTACT THE LAB WITHIN 5 DAYS.    Drug Screen Cutoff Levels:  AMPHETAMINE/METHAMPHETAMINES  1000 ng/mL  BARBITURATES     200 ng/mL  BENZODIAZEPINES     200 ng/mL  COCAINE      300 ng/mL  METHADONE      300 ng/mL  OPIATES      300 ng/mL  PHENCYCLIDINE     25 ng/mL  THC       50  ng/mL  OXYCODONE      100 ng/mL  FENTANYL      5 ng/mL  HYDROCODONE     300 ng/mL    TSH, 3rd generation with Free T4 reflex [[id number]]  (Normal) Collected: 09/26/24 1001    Lab Status: Final result Specimen: Blood from Arm, Left Updated: 09/26/24 1050     TSH 3RD GENERATON 2.815 uIU/mL     HS Troponin 0hr (reflex protocol) [[id number]]  (Normal) Collected: 09/26/24 1001    Lab Status: Final result Specimen: Blood from Arm, Left Updated: 09/26/24 1040     hs TnI 0hr <2 ng/L     FLU/COVID Rapid Antigen (30 min. TAT) - Preferred screening test in ED [[id number]]  (Normal) Collected: 09/26/24 1001    Lab Status: Final result Specimen: Nares from Nose Updated: 09/26/24 1035     SARS COV Rapid Antigen Negative     Influenza A Rapid Antigen Negative     Influenza B Rapid Antigen Negative    Narrative:      This test has been performed using the [employer] Audra 2 FLU+SARS Antigen test under the Emergency Use Authorization (EUA). This test has been validated by the  and verified by the performing laboratory. The Audra uses lateral flow immunofluorescent sandwich assay to detect SARS-COV, Influenza A and Influenza B Antigen.     The [employer] Audra 2 SARS Antigen test does not differentiate between SARS-CoV and SARS-CoV-2.     Negative results are presumptive and may be confirmed with a molecular assay, if necessary, for patient management. Negative results do not rule out SARS-CoV-2 or influenza infection and should not be used as the sole basis for treatment or patient management decisions. A negative test result may occur if the level of antigen in a sample is below the limit of detection of this test.     Positive results are indicative of the presence of viral antigens, but do not rule out bacterial infection or co-infection with other viruses.     All test results should be used as an adjunct to clinical observations and other information available to the provider.    FOR PEDIATRIC PATIENTS - copy/paste COVID  Guidelines URL to browser: [email or web address]    Comprehensive metabolic panel [[id number]] Collected: 09/26/24 1001    Lab Status: Final result Specimen: Blood from Arm, Left Updated: 09/26/24 1033     Sodium 136 mmol/L      Potassium 3.7 mmol/L      Chloride 100 mmol/L      CO2 28 mmol/L      ANION GAP 8 mmol/L      BUN 13 mg/dL      Creatinine 0.72 mg/dL      Glucose 77 mg/dL      Calcium 9.6 mg/dL      AST 14 U/L      ALT 10 U/L      Alkaline Phosphatase 53 U/L      Total Protein 7.8 g/dL      Albumin 4.7 g/dL      Total Bilirubin 0.48 mg/dL      eGFR 110 ml/min/1.73sq m     Narrative:      National Kidney Disease Foundation guidelines for Chronic Kidney Disease (CKD):     Stage 1 with normal or high GFR (GFR > 90 mL/min/1.73 square meters)    Stage 2 Mild CKD (GFR = 60-89 mL/min/1.73 square meters)    Stage 3A Moderate CKD (GFR = 45-59 mL/min/1.73 square meters)    Stage 3B Moderate CKD (GFR = 30-44 mL/min/1.73 square meters)    Stage 4 Severe CKD (GFR = 15-29 mL/min/1.73 square meters)    Stage 5 End Stage CKD (GFR <15 mL/min/1.73 square meters)  Note: GFR calculation is accurate only with a steady state creatinine    Magnesium [[id number]]  (Abnormal) Collected: 09/26/24 1001    Lab Status: Final result Specimen: Blood from Arm, Left Updated: 09/26/24 1033     Magnesium 1.8 mg/dL     D-Dimer [[id number]]  (Normal) Collected: 09/26/24 1001    Lab Status: Final result Specimen: Blood from Arm, Left Updated: 09/26/24 1033     D-Dimer, Quant 0.47 ug/ml FEU     UA w Reflex to Microscopic w Reflex to Culture [[id number]]  (Abnormal) Collected: 09/26/24 1002    Lab Status: Final result Specimen: Urine, Clean Catch Updated: 09/26/24 1028     Color, UA Light Yellow     Clarity, UA Clear     Specific Gravity, UA <1.005     pH, UA 7.0     Leukocytes, UA Negative     Nitrite, UA Negative     Protein, UA Negative mg/dl      Glucose, UA Negative mg/dl      Ketones, UA  Negative mg/dl      Urobilinogen, UA <2.0 mg/dl      Bilirubin, UA Negative     Occult Blood, UA Negative    CBC and differential [[id number]] Collected: 09/26/24 1001    Lab Status: Final result Specimen: Blood from Arm, Left Updated: 09/26/24 1017     WBC 4.99 Thousand/uL      RBC 4.69 Million/uL      Hemoglobin 14.3 g/dL      Hematocrit 41.2 %      MCV 88 fL      MCH 30.5 pg      MCHC 34.7 g/dL      RDW 11.9 %      MPV 10.0 fL      Platelets 244 Thousands/uL      nRBC 0 /100 WBCs      Segmented % 65 %      Immature Grans % 0 %      Lymphocytes % 23 %      Monocytes % 6 %      Eosinophils Relative 5 %      Basophils Relative 1 %      Absolute Neutrophils 3.26 Thousands/µL      Absolute Immature Grans 0.02 Thousand/uL      Absolute Lymphocytes 1.13 Thousands/µL      Absolute Monocytes 0.31 Thousand/µL      Eosinophils Absolute 0.23 Thousand/µL      Basophils Absolute 0.04 Thousands/µL     POCT pregnancy, urine [[id number]]  (Normal) Resulted: 09/26/24 1002    Lab Status: Final result Updated: 09/26/24 1002     EXT Preg Test, Ur Negative     Control Valid            XR chest 2 views   Final Interpretation by Jasper Ye MD (09/26 1017)      No acute cardiopulmonary disease.            Workstation performed: [id number]             ECG 12 Lead Documentation Only    Date/Time: 9/26/2024 10:05 AM    Performed by: Edmundo Davis DO  Authorized by: Edmundo Davis DO    Indications / Diagnosis:  Palpitation  ECG reviewed by me, the ED Provider: yes    Patient location:  ED  Previous ECG:     Previous ECG:  Compared to current    Similarity:  No change    Comparison to cardiac monitor: Yes    Interpretation:     Interpretation: abnormal    Comments:      Sinus rhythm, rate 91, left axis deviation, right bundle branch block pattern noted, no acute ST/T wave abnormalities noted, left anterior fascicular block noted as well, unchanged from previous study.      ED Medication and Procedure Management   Prior to  Admission Medications   Prescriptions Last Dose Informant Patient Reported? Taking?   escitalopram (LEXAPRO) 20 mg tablet   No No   Sig: TAKE 1 TABLET BY MOUTH EVERY DAY   ibuprofen (MOTRIN) 600 mg tablet   No No   Sig: Take 1 tablet (600 mg total) by mouth every 6 (six) hours as needed for mild pain or moderate pain   polymyxin b-trimethoprim (POLYTRIM) ophthalmic solution   No No   Sig: Administer 1 drop to both eyes every 4 (four) hours      Facility-Administered Medications: None     Discharge Medication List as of 9/26/2024  1:14 PM        CONTINUE these medications which have NOT CHANGED    Details   escitalopram (LEXAPRO) 20 mg tablet TAKE 1 TABLET BY MOUTH EVERY DAY, Starting Wed 7/24/2024, Normal      ibuprofen (MOTRIN) 600 mg tablet Take 1 tablet (600 mg total) by mouth every 6 (six) hours as needed for mild pain or moderate pain, Starting Sat 9/19/2020, Normal      polymyxin b-trimethoprim (POLYTRIM) ophthalmic solution Administer 1 drop to both eyes every 4 (four) hours, Starting Wed 8/23/2023, Normal           No discharge procedures on file.  ED SEPSIS DOCUMENTATION   Time reflects when diagnosis was documented in both MDM as applicable and the Disposition within this note       Time User Action Codes Description Comment    9/26/2024  1:13 PM Edmundo Davis [R00.2] Palpitations     9/26/2024  1:13 PM Edmundo Davis [F41.9] Anxiety                  Edmundo Davis DO  09/26/24 1502

## 2024-09-27 LAB
ATRIAL RATE: 91 BPM
P AXIS: 50 DEGREES
PR INTERVAL: 154 MS
QRS AXIS: -64 DEGREES
QRSD INTERVAL: 120 MS
QT INTERVAL: 388 MS
QTC INTERVAL: 477 MS
T WAVE AXIS: -31 DEGREES
VENTRICULAR RATE: 91 BPM

## 2024-09-27 PROCEDURE — 93010 ELECTROCARDIOGRAM REPORT: CPT | Performed by: INTERNAL MEDICINE

## 2024-10-11 ENCOUNTER — OFFICE VISIT (OUTPATIENT)
Dept: FAMILY MEDICINE CLINIC | Facility: CLINIC | Age: 34
End: 2024-10-11
Payer: COMMERCIAL

## 2024-10-11 VITALS
OXYGEN SATURATION: 98 % | TEMPERATURE: 98.2 F | HEIGHT: 62 IN | SYSTOLIC BLOOD PRESSURE: 112 MMHG | BODY MASS INDEX: 25.25 KG/M2 | HEART RATE: 68 BPM | WEIGHT: 137.2 LBS | DIASTOLIC BLOOD PRESSURE: 70 MMHG

## 2024-10-11 DIAGNOSIS — Z11.1 ENCOUNTER FOR PPD TEST: ICD-10-CM

## 2024-10-11 DIAGNOSIS — Q21.21 PARTIAL ATRIOVENTRICULAR CANAL DEFECT: ICD-10-CM

## 2024-10-11 DIAGNOSIS — R10.11 RIGHT UPPER QUADRANT ABDOMINAL PAIN: ICD-10-CM

## 2024-10-11 DIAGNOSIS — F41.9 ANXIETY: ICD-10-CM

## 2024-10-11 DIAGNOSIS — Z00.00 ANNUAL PHYSICAL EXAM: Primary | ICD-10-CM

## 2024-10-11 PROCEDURE — 86580 TB INTRADERMAL TEST: CPT

## 2024-10-11 PROCEDURE — 99214 OFFICE O/P EST MOD 30 MIN: CPT | Performed by: NURSE PRACTITIONER

## 2024-10-11 PROCEDURE — 99395 PREV VISIT EST AGE 18-39: CPT | Performed by: NURSE PRACTITIONER

## 2024-10-11 NOTE — PROGRESS NOTES
Adult Annual Physical  Name: Maye Barrera      : 1990      MRN: [id number]  Encounter Provider: BIRDIE Machado  Encounter Date: 10/11/2024   Encounter department: [facility]    Assessment & Plan  Annual physical exam  Medications and recent labs reviewed.       Anxiety  Condition stable. Taking Lexapro.         Partial atrioventricular canal defect  Stable. Had repair 3 months of age. Follows with cardiology.         Encounter for PPD test    Orders:    TB Skin Test    Right upper quadrant abdominal pain    Orders:    US abdomen complete; Future    Immunizations and preventive care screenings were discussed with patient today. Appropriate education was printed on patient's after visit summary.    Counseling:  Alcohol/drug use: discussed moderation in alcohol intake, the recommendations for healthy alcohol use, and avoidance of illicit drug use.  Dental Health: discussed importance of regular tooth brushing, flossing, and dental visits.  Injury prevention: discussed safety/seat belts, safety helmets, smoke detectors, carbon monoxide detectors, and smoking near bedding or upholstery.  Sexual health: discussed sexually transmitted diseases, partner selection, use of condoms, avoidance of unintended pregnancy, and contraceptive alternatives.  Exercise: the importance of regular exercise/physical activity was discussed. Recommend exercise 3-5 times per week for at least 30 minutes.          History of Present Illness     Adult Annual Physical:  Patient presents for annual physical. Here for physical. Needs ppd for work. C/o intermittent right abdominal pain that started 4 years ago after having hernia repair. Pain has gotten worse over the last year..     Diet and Physical Activity:  - Diet/Nutrition: well balanced diet.  - Exercise: 3-4 times a week on average.    Depression Screening:  - PHQ-2 Score: 0  - PHQ-9 Score: 0    General Health:  - Sleep: sleeps well.  -  Hearing: normal hearing bilateral ears.  - Vision: goes for regular eye exams.  - Dental: regular dental visits.    /GYN Health:  - Follows with GYN: no.   - Menopause: premenopausal.   - Last menstrual cycle: 10/1/2024.   - History of STDs: no    Advanced Care Planning:  - Has an advanced directive?: yes    - Has a durable medical POA?: yes      Review of Systems   Constitutional:  Negative for activity change, appetite change, chills, diaphoresis, fatigue and fever.   HENT:  Negative for congestion, dental problem, drooling, ear discharge, ear pain, facial swelling, hearing loss, mouth sores, nosebleeds, postnasal drip, rhinorrhea, sinus pressure, sinus pain, sneezing, sore throat, tinnitus, trouble swallowing and voice change.    Eyes:  Negative for photophobia, pain, discharge, redness, itching and visual disturbance.   Respiratory:  Negative for cough, choking, chest tightness, shortness of breath, wheezing and stridor.    Cardiovascular:  Negative for chest pain, palpitations and leg swelling.   Gastrointestinal:  Positive for abdominal pain. Negative for abdominal distention, anal bleeding, blood in stool, constipation, diarrhea, nausea, rectal pain and vomiting.   Endocrine: Negative for cold intolerance, heat intolerance, polydipsia, polyphagia and polyuria.   Genitourinary:  Negative for decreased urine volume, difficulty urinating, dyspareunia, dysuria, enuresis, flank pain, frequency, genital sores, hematuria, menstrual problem, pelvic pain, urgency, vaginal bleeding, vaginal discharge and vaginal pain.   Musculoskeletal:  Negative for arthralgias, back pain, gait problem, joint swelling, myalgias, neck pain and neck stiffness.   Skin:  Negative for color change, pallor, rash and wound.   Neurological:  Negative for dizziness, tremors, seizures, syncope, facial asymmetry, speech difficulty, weakness, light-headedness, numbness and headaches.   Hematological:  Negative for adenopathy. Does not  "bruise/bleed easily.   Psychiatric/Behavioral:  Negative for agitation, behavioral problems, confusion, decreased concentration, dysphoric mood, hallucinations, self-injury, sleep disturbance and suicidal ideas. The patient is not nervous/anxious and is not hyperactive.      Medical History Reviewed by provider this encounter:  Tobacco  Allergies  Meds  Problems  Med Hx  Surg Hx  Fam Hx       Current Outpatient Medications on File Prior to Visit   Medication Sig Dispense Refill    escitalopram (LEXAPRO) 20 mg tablet TAKE 1 TABLET BY MOUTH EVERY DAY 30 tablet 5    [DISCONTINUED] ibuprofen (MOTRIN) 600 mg tablet Take 1 tablet (600 mg total) by mouth every 6 (six) hours as needed for mild pain or moderate pain (Patient not taking: Reported on 10/11/2024) 20 tablet 0    [DISCONTINUED] polymyxin b-trimethoprim (POLYTRIM) ophthalmic solution Administer 1 drop to both eyes every 4 (four) hours (Patient not taking: Reported on 10/11/2024) 10 mL 0     No current facility-administered medications on file prior to visit.        Objective     /70 (BP Location: Right arm, Patient Position: Sitting, Cuff Size: Standard)   Pulse 68   Temp 98.2 °F (36.8 °C) (Tympanic)   Ht 5' 2\" (1.575 m)   Wt 62.2 kg (137 lb 3.2 oz)   SpO2 98%   BMI 25.09 kg/m²     Physical Exam  Vitals and nursing note reviewed.   Constitutional:       General: She is not in acute distress.     Appearance: Normal appearance. She is normal weight. She is not ill-appearing, toxic-appearing or diaphoretic.   HENT:      Head: Normocephalic.      Right Ear: Tympanic membrane, ear canal and external ear normal. There is no impacted cerumen.      Left Ear: Tympanic membrane, ear canal and external ear normal. There is no impacted cerumen.      Nose: Nose normal. No congestion or rhinorrhea.      Mouth/Throat:      Mouth: Mucous membranes are moist.      Pharynx: Oropharynx is clear. No oropharyngeal exudate or posterior oropharyngeal erythema.   Eyes: "      General: No scleral icterus.        Right eye: No discharge.         Left eye: No discharge.      Extraocular Movements: Extraocular movements intact.      Conjunctiva/sclera: Conjunctivae normal.      Pupils: Pupils are equal, round, and reactive to light.   Neck:      Vascular: No carotid bruit.   Cardiovascular:      Rate and Rhythm: Normal rate and regular rhythm.      Pulses: Normal pulses.      Heart sounds: Normal heart sounds. No murmur heard.     No friction rub. No gallop.   Pulmonary:      Effort: Pulmonary effort is normal. No respiratory distress.      Breath sounds: Normal breath sounds. No stridor. No wheezing, rhonchi or rales.   Chest:      Chest wall: No tenderness.   Abdominal:      General: Abdomen is flat. Bowel sounds are normal. There is no distension.      Palpations: Abdomen is soft. There is no mass.      Tenderness: There is abdominal tenderness in the right upper quadrant. There is no right CVA tenderness, left CVA tenderness, guarding or rebound.      Hernia: No hernia is present.   Musculoskeletal:         General: No swelling, tenderness, deformity or signs of injury. Normal range of motion.      Cervical back: Normal range of motion and neck supple. No rigidity or tenderness. No muscular tenderness.      Right lower leg: No edema.      Left lower leg: No edema.   Lymphadenopathy:      Cervical: No cervical adenopathy.   Skin:     General: Skin is warm.      Capillary Refill: Capillary refill takes less than 2 seconds.      Coloration: Skin is not jaundiced or pale.      Findings: No bruising, erythema, lesion or rash.   Neurological:      General: No focal deficit present.      Mental Status: She is alert and oriented to person, place, and time.      Cranial Nerves: No cranial nerve deficit.      Sensory: No sensory deficit.      Motor: No weakness.      Coordination: Coordination normal.      Gait: Gait normal.      Deep Tendon Reflexes: Reflexes normal.   Psychiatric:          Mood and Affect: Mood normal.         Behavior: Behavior normal.         Thought Content: Thought content normal.         Judgment: Judgment normal.

## 2024-10-11 NOTE — PATIENT INSTRUCTIONS
"  Continue current medications.  PPD given today. Return 48-72 hours to have read.    Patient Education     Routine physical for adults   The Basics   Written by the doctors and editors at [facility]   What is a physical? -- A physical is a routine visit, or \"check-up,\" with your doctor. You might also hear it called a \"wellness visit\" or \"preventive visit.\"  During each visit, the doctor will:   Ask about your physical and mental health   Ask about your habits, behaviors, and lifestyle   Do an exam   Give you vaccines if needed   Talk to you about any medicines you take   Give advice about your health   Answer your questions  Getting regular check-ups is an important part of taking care of your health. It can help your doctor find and treat any problems you have. But it's also important for preventing health problems.  A routine physical is different from a \"sick visit.\" A sick visit is when you see a doctor because of a health concern or problem. Since physicals are scheduled ahead of time, you can think about what you want to ask the doctor.  How often should I get a physical? -- It depends on your age and health. In general, for people age 21 years and older:   If you are younger than 50 years, you might be able to get a physical every 3 years.   If you are 50 years or older, your doctor might recommend a physical every year.  If you have an ongoing health condition, like diabetes or high blood pressure, your doctor will probably want to see you more often.  What happens during a physical? -- In general, each visit will include:   Physical exam - The doctor or nurse will check your height, weight, heart rate, and blood pressure. They will also look at your eyes and ears. They will ask about how you are feeling and whether you have any symptoms that bother you.   Medicines - It's a good idea to bring a list of all the medicines you take to each doctor visit. Your doctor will talk to you about your medicines and " "answer any questions. Tell them if you are having any side effects that bother you. You should also tell them if you are having trouble paying for any of your medicines.   Habits and behaviors - This includes:   Your diet   Your exercise habits   Whether you smoke, drink alcohol, or use drugs   Whether you are sexually active   Whether you feel safe at home  Your doctor will talk to you about things you can do to improve your health and lower your risk of health problems. They will also offer help and support. For example, if you want to quit smoking, they can give you advice and might prescribe medicines. If you want to improve your diet or get more physical activity, they can help you with this, too.   Lab tests, if needed - The tests you get will depend on your age and situation. For example, your doctor might want to check your:   Cholesterol   Blood sugar   Iron level   Vaccines - The recommended vaccines will depend on your age, health, and what vaccines you already had. Vaccines are very important because they can prevent certain serious or deadly infections.   Discussion of screening - \"Screening\" means checking for diseases or other health problems before they cause symptoms. Your doctor can recommend screening based on your age, risk, and preferences. This might include tests to check for:   Cancer, such as breast, prostate, cervical, ovarian, colorectal, prostate, lung, or skin cancer   Sexually transmitted infections, such as chlamydia and gonorrhea   Mental health conditions like depression and anxiety  Your doctor will talk to you about the different types of screening tests. They can help you decide which screenings to have. They can also explain what the results might mean.   Answering questions - The physical is a good time to ask the doctor or nurse questions about your health. If needed, they can refer you to other doctors or specialists, too.  Adults older than 65 years often need other care, " too. As you get older, your doctor will talk to you about:   How to prevent falling at home   Hearing or vision tests   Memory testing   How to take your medicines safely   Making sure that you have the help and support you need at home  All topics are updated as new evidence becomes available and our peer review process is complete.  This topic retrieved from [employer] on: May 02, 2024.  Topic 109544 Version 1.0  Release: 32.4.3 - C32.122  © 2024 [employer] and/or its affiliates. All rights reserved.  Consumer Information Use and Disclaimer   Disclaimer: This generalized information is a limited summary of diagnosis, treatment, and/or medication information. It is not meant to be comprehensive and should be used as a tool to help the user understand and/or assess potential diagnostic and treatment options. It does NOT include all information about conditions, treatments, medications, side effects, or risks that may apply to a specific patient. It is not intended to be medical advice or a substitute for the medical advice, diagnosis, or treatment of a health care provider based on the health care provider's examination and assessment of a patient's specific and unique circumstances. Patients must speak with a health care provider for complete information about their health, medical questions, and treatment options, including any risks or benefits regarding use of medications. This information does not endorse any treatments or medications as safe, effective, or approved for treating a specific patient. [employer] and its affiliates disclaim any warranty or liability relating to this information or the use thereof.The use of this information is governed by the Terms of Use, available at https://www.wolterskluwer.com/en/know/clinical-effectiveness-terms. 2024© [employer] and its affiliates and/or licensors. All rights reserved.  Copyright   © 2024 [employer] and/or its affiliates. All rights  reserved.

## 2024-10-14 ENCOUNTER — CLINICAL SUPPORT (OUTPATIENT)
Dept: FAMILY MEDICINE CLINIC | Facility: CLINIC | Age: 34
End: 2024-10-14

## 2024-10-14 DIAGNOSIS — Z11.1 ENCOUNTER FOR PPD SKIN TEST READING: Primary | ICD-10-CM

## 2024-10-14 LAB
INDURATION: 0 MM
TB SKIN TEST: NEGATIVE

## 2025-04-30 ENCOUNTER — VBI (OUTPATIENT)
Dept: ADMINISTRATIVE | Facility: OTHER | Age: 35
End: 2025-04-30

## 2025-07-10 DIAGNOSIS — F41.9 ANXIETY: ICD-10-CM

## 2025-07-11 RX ORDER — ESCITALOPRAM OXALATE 20 MG/1
20 TABLET ORAL DAILY
Qty: 30 TABLET | Refills: 2 | Status: SHIPPED | OUTPATIENT
Start: 2025-07-11

## (undated) DEVICE — GLOVE SRG BIOGEL 6.5

## (undated) DEVICE — BETHLEHEM UNIVERSAL MINOR GEN: Brand: CARDINAL HEALTH

## (undated) DEVICE — SUT VICRYL 3-0 SH 27 IN J416H

## (undated) DEVICE — SUT PROLENE 2-0 CT-2 30 IN 8411H

## (undated) DEVICE — MEDI-VAC YANKAUER SUCTION HANDLE W/BULBOUS AND CONTROL VENT: Brand: CARDINAL HEALTH

## (undated) DEVICE — GLOVE INDICATOR PI UNDERGLOVE SZ 8 BLUE

## (undated) DEVICE — TUBING SUCTION 5MM X 12 FT

## (undated) DEVICE — ELECTRODE BLADE MOD E-Z CLEAN 2.5IN 6.4CM -0012M

## (undated) DEVICE — GLOVE SRG BIOGEL ECLIPSE 8

## (undated) DEVICE — ADHESIVE SKIN HIGH VISCOSITY EXOFIN 1ML

## (undated) DEVICE — SUT MONOCRYL 4-0 PS-2 27 IN Y426H

## (undated) DEVICE — NEEDLE 25G X 1 1/2

## (undated) DEVICE — GLOVE INDICATOR PI UNDERGLOVE SZ 6.5 BLUE

## (undated) DEVICE — PAD GROUNDING ADULT

## (undated) DEVICE — PENCIL ELECTROSURG E-Z CLEAN -0035H

## (undated) DEVICE — CHLORAPREP HI-LITE 26ML ORANGE

## (undated) DEVICE — STANDARD SURGICAL GOWN, L: Brand: CONVERTORS

## (undated) DEVICE — 2000CC GUARDIAN II: Brand: GUARDIAN

## (undated) DEVICE — SCD SEQUENTIAL COMPRESSION COMFORT SLEEVE MEDIUM KNEE LENGTH: Brand: KENDALL SCD

## (undated) DEVICE — INTENDED FOR TISSUE SEPARATION, AND OTHER PROCEDURES THAT REQUIRE A SHARP SURGICAL BLADE TO PUNCTURE OR CUT.: Brand: BARD-PARKER SAFETY BLADES SIZE 15, STERILE

## (undated) DEVICE — VIAL DECANTER

## (undated) DEVICE — TOWEL SET X-RAY

## (undated) DEVICE — DRAPE EQUIPMENT RF WAND